# Patient Record
Sex: FEMALE | Race: WHITE | NOT HISPANIC OR LATINO | Employment: UNEMPLOYED | URBAN - METROPOLITAN AREA
[De-identification: names, ages, dates, MRNs, and addresses within clinical notes are randomized per-mention and may not be internally consistent; named-entity substitution may affect disease eponyms.]

---

## 2017-02-08 ENCOUNTER — TELEPHONE (OUTPATIENT)
Dept: FAMILY MEDICINE | Facility: CLINIC | Age: 52
End: 2017-02-08

## 2017-02-15 ENCOUNTER — TELEPHONE (OUTPATIENT)
Dept: FAMILY MEDICINE | Facility: CLINIC | Age: 52
End: 2017-02-15

## 2017-02-15 DIAGNOSIS — E03.9 MYXEDEMA HEART DISEASE: Primary | ICD-10-CM

## 2017-02-15 DIAGNOSIS — I51.9 MYXEDEMA HEART DISEASE: Primary | ICD-10-CM

## 2017-02-15 DIAGNOSIS — E11.9 DIABETES MELLITUS (H): ICD-10-CM

## 2017-02-15 LAB
ALBUMIN SERPL-MCNC: 3.2 G/DL (ref 3.4–5)
ANION GAP SERPL CALCULATED.3IONS-SCNC: 10 MMOL/L (ref 3–14)
BUN SERPL-MCNC: 19 MG/DL (ref 7–30)
CALCIUM SERPL-MCNC: 8.6 MG/DL (ref 8.5–10.1)
CHLORIDE SERPL-SCNC: 110 MMOL/L (ref 94–109)
CHOLEST SERPL-MCNC: 157 MG/DL
CO2 SERPL-SCNC: 23 MMOL/L (ref 20–32)
CREAT SERPL-MCNC: 0.8 MG/DL (ref 0.52–1.04)
GFR SERPL CREATININE-BSD FRML MDRD: 75 ML/MIN/1.7M2
GLUCOSE SERPL-MCNC: 132 MG/DL (ref 70–99)
HBA1C MFR BLD: 6.9 % (ref 4.3–6)
HDLC SERPL-MCNC: 52 MG/DL
LDLC SERPL CALC-MCNC: 81 MG/DL
NONHDLC SERPL-MCNC: 105 MG/DL
PHOSPHATE SERPL-MCNC: 3.2 MG/DL (ref 2.5–4.5)
POTASSIUM SERPL-SCNC: 3.8 MMOL/L (ref 3.4–5.3)
SODIUM SERPL-SCNC: 143 MMOL/L (ref 133–144)
T4 FREE SERPL-MCNC: 1.54 NG/DL (ref 0.76–1.46)
TRIGL SERPL-MCNC: 120 MG/DL
TSH SERPL DL<=0.05 MIU/L-ACNC: 0.08 MU/L (ref 0.4–4)

## 2017-02-15 PROCEDURE — 80061 LIPID PANEL: CPT | Performed by: INTERNAL MEDICINE

## 2017-02-15 PROCEDURE — 83036 HEMOGLOBIN GLYCOSYLATED A1C: CPT | Performed by: INTERNAL MEDICINE

## 2017-02-15 PROCEDURE — 84439 ASSAY OF FREE THYROXINE: CPT | Performed by: INTERNAL MEDICINE

## 2017-02-15 PROCEDURE — 84443 ASSAY THYROID STIM HORMONE: CPT | Performed by: INTERNAL MEDICINE

## 2017-02-15 PROCEDURE — 80069 RENAL FUNCTION PANEL: CPT | Performed by: INTERNAL MEDICINE

## 2017-02-15 PROCEDURE — 36415 COLL VENOUS BLD VENIPUNCTURE: CPT | Performed by: INTERNAL MEDICINE

## 2017-02-22 PROBLEM — E11.65 TYPE 2 DIABETES MELLITUS WITH HYPERGLYCEMIA (H): Status: ACTIVE | Noted: 2017-02-22

## 2017-02-22 NOTE — TELEPHONE ENCOUNTER
As she uses our clinic for acute care, it would be helpful to have copies of her visits from endocrine and her lab work.  Charleen Sterling, CNP

## 2017-02-22 NOTE — TELEPHONE ENCOUNTER
She gets the blood work at our clinic, it appears, so results are in the system.  Charleen Sterling, CNP

## 2017-02-22 NOTE — TELEPHONE ENCOUNTER
"  Panel Management Review      Patient has the following on her problem list:     Diabetes    ASA: Failed    Last A1C  Lab Results   Component Value Date    A1C 6.9 02/15/2017    A1C 13.5 08/09/2016    A1C 13.0 07/29/2016    A1C 6.9 12/28/2015     A1C tested: failed     Last LDL:    Lab Results   Component Value Date    CHOL 157 02/15/2017     Lab Results   Component Value Date    HDL 52 02/15/2017     Lab Results   Component Value Date    LDL 81 02/15/2017     Lab Results   Component Value Date    TRIG 120 02/15/2017     Lab Results   Component Value Date    CHOLHDLRATIO 3.0 05/30/2014     Lab Results   Component Value Date    NHDL 105 02/15/2017       Is the patient on a Statin? NO             Is the patient on Aspirin? NO        Last three blood pressure readings:  BP Readings from Last 3 Encounters:   11/22/16 138/87   12/14/10 142/80   12/01/10 126/70       Date of last diabetes office visit: patient is seeing her Endocrinologist tomorrow 2/23.     Tobacco History:     History   Smoking Status     Former Smoker     Types: Cigarettes     Quit date: 1/1/2005   Smokeless Tobacco     Not on file           Composite cancer screening  Chart review shows that this patient is due/due soon for the following Pap Smear, Mammogram and Colonoscopy  Summary:    Patient is due/failing the following:   COLONOSCOPY, MAMMOGRAM, PAP and STATIN    Action needed:   None     Type of outreach:    Phone, spoke to patient.  She states she is Not taking a statin. She states her \"cholesterol has never been a issue\". She has apt with Endocrinology tomorrow 2/23/17.     Questions for provider review:    None                                                                                                                                    Rodolfo BRANDON CMA       Chart routed to Provider .          "

## 2018-01-24 DIAGNOSIS — E78.2 MIXED HYPERLIPIDEMIA: ICD-10-CM

## 2018-01-24 DIAGNOSIS — E11.9 DIABETES MELLITUS (H): Primary | ICD-10-CM

## 2018-01-24 DIAGNOSIS — E03.9 ACQUIRED HYPOTHYROIDISM: ICD-10-CM

## 2018-01-24 LAB
ALBUMIN SERPL-MCNC: 3.5 G/DL (ref 3.4–5)
ANION GAP SERPL CALCULATED.3IONS-SCNC: 9 MMOL/L (ref 3–14)
BUN SERPL-MCNC: 21 MG/DL (ref 7–30)
CALCIUM SERPL-MCNC: 8.9 MG/DL (ref 8.5–10.1)
CHLORIDE SERPL-SCNC: 109 MMOL/L (ref 94–109)
CHOLEST SERPL-MCNC: 180 MG/DL
CO2 SERPL-SCNC: 23 MMOL/L (ref 20–32)
CREAT SERPL-MCNC: 0.74 MG/DL (ref 0.52–1.04)
CREAT UR-MCNC: 126 MG/DL
GFR SERPL CREATININE-BSD FRML MDRD: 82 ML/MIN/1.7M2
GLUCOSE SERPL-MCNC: 140 MG/DL (ref 70–99)
HBA1C MFR BLD: 7.1 % (ref 4.3–6)
HDLC SERPL-MCNC: 62 MG/DL
LDLC SERPL CALC-MCNC: 94 MG/DL
MICROALBUMIN UR-MCNC: 109 MG/L
MICROALBUMIN/CREAT UR: 86.51 MG/G CR (ref 0–25)
NONHDLC SERPL-MCNC: 118 MG/DL
PHOSPHATE SERPL-MCNC: 3.6 MG/DL (ref 2.5–4.5)
POTASSIUM SERPL-SCNC: 3.8 MMOL/L (ref 3.4–5.3)
SODIUM SERPL-SCNC: 141 MMOL/L (ref 133–144)
TRIGL SERPL-MCNC: 122 MG/DL
TSH SERPL DL<=0.005 MIU/L-ACNC: 1.89 MU/L (ref 0.4–4)

## 2018-01-24 PROCEDURE — 84443 ASSAY THYROID STIM HORMONE: CPT | Performed by: INTERNAL MEDICINE

## 2018-01-24 PROCEDURE — 80069 RENAL FUNCTION PANEL: CPT | Performed by: INTERNAL MEDICINE

## 2018-01-24 PROCEDURE — 80061 LIPID PANEL: CPT | Performed by: INTERNAL MEDICINE

## 2018-01-24 PROCEDURE — 36415 COLL VENOUS BLD VENIPUNCTURE: CPT | Performed by: INTERNAL MEDICINE

## 2018-01-24 PROCEDURE — 83036 HEMOGLOBIN GLYCOSYLATED A1C: CPT | Performed by: INTERNAL MEDICINE

## 2018-01-24 PROCEDURE — 82043 UR ALBUMIN QUANTITATIVE: CPT | Performed by: INTERNAL MEDICINE

## 2019-02-14 NOTE — PROGRESS NOTES
SUBJECTIVE:   Mary Anne Oates is a 53 year old female who presents to clinic today for the following health issues:      Chief Complaint   Patient presents with     Musculoskeletal Problem     left knee pain     Past Medical History:   Diagnosis Date     Diabetes mellitus type 2, controlled, with complications (H)      Hypothyroidism        Past Surgical History:   Procedure Laterality Date     TUBAL LIGATION      procedure failed       Family History   Problem Relation Age of Onset     Hypertension Mother      Diabetes Type 2  Mother      Diabetes Father      Hypertension Father      Cancer Father         lung     Hypertension Maternal Grandmother      Cancer Maternal Grandmother      Hypertension Maternal Grandfather      Cancer Paternal Grandmother      Cerebrovascular Disease No family hx of      C.A.D. No family hx of      Breast Cancer No family hx of      Cancer - colorectal No family hx of      Prostate Cancer No family hx of        Social History     Tobacco Use     Smoking status: Former Smoker     Packs/day: 1.00     Years: 22.00     Pack years: 22.00     Types: Cigarettes     Last attempt to quit: 2005     Years since quittin.4     Smokeless tobacco: Never Used   Substance Use Topics     Alcohol use: No     Comment: rarely 1-2 every year      No Known Allergies    Current Outpatient Medications   Medication     blood glucose monitoring (ACCU-CHEK DEBI PLUS) test strip     blood glucose monitoring (ACCU-CHEK FASTCLIX) lancets     insulin glargine (LANTUS SOLOSTAR) 100 UNIT/ML PEN     insulin lispro (HUMALOG KWIKPEN) 100 UNIT/ML soln     insulin pen needle 30G X 8 MM     LEVOTHYROXINE SODIUM 200 MCG OR TABS     losartan (COZAAR) 25 MG tablet     No current facility-administered medications for this visit.      Reviewed and updated as needed this visit by clinical staff  Tobacco  Allergies  Meds  Med Hx  Surg Hx  Fam Hx  Soc Hx      Reviewed and updated as needed this visit by  Provider  Allergies  Meds  Med Hx  Surg Hx        1. Establish care    2. Knee pain: Left.  Started Thursday.  Bulloch a snapping sensation while walking.  Soreness and tightness.  No pain but felt like a buckling sensation.  Had to hobble.  States of tightness involving back of leg 1.5 weeks ago.  An hour later, hard to bend, pain on the outside knee.  The following day, pain improved and ROM improved.  As of today, no pain.  States of swelling.  Felt like knee cap slid off initially.  History of varicose vein, knee injury 30 years ago from fall, and power lifting.  0/10, full ROM.  Swelling has unchanged.  No bruising.  No knee surgery.  Took aspirin with some relief.     3. Diabetes type 2: Diagnosed 3 years ago.  Currently blood sugars are ranging 170-180s.  Normally 120s.  Currently on lantus and lispro.  Patient is overweight.  Tries to eat healthy.  Does not exercise.  Patient is currently being followed by Dr. Jose Metzger.  Patient has not got her eyes checked.    4. Hypothyroidism: Patient had ablation treatment.  Currently on synthroid 200 mcg daily.  Diagnosed around 2004.    5. Health maintenance: Patient plans to get her mammogram.    ROS:  Review of Systems   Constitutional: Negative for activity change, appetite change, chills, fatigue, fever and unexpected weight change.   HENT: Negative for congestion, ear discharge, ear pain, sore throat, trouble swallowing and voice change.    Eyes: Negative for photophobia, pain, discharge, redness and visual disturbance.   Respiratory: Negative for cough, shortness of breath and wheezing.    Cardiovascular: Negative for chest pain, palpitations and leg swelling.   Gastrointestinal: Negative for abdominal distention, abdominal pain, constipation, diarrhea, nausea and vomiting.   Genitourinary: Negative for dysuria, flank pain, hematuria and urgency.   Musculoskeletal: Negative for back pain, neck pain and neck stiffness.        States of left knee swelling    Skin: Negative for color change, rash and wound.   Neurological: Negative for dizziness, seizures, syncope, weakness, numbness and headaches.   Psychiatric/Behavioral: Negative for agitation, confusion and sleep disturbance. The patient is not nervous/anxious.        OBJECTIVE:     /90 (BP Location: Left arm, Cuff Size: Adult Large)   Pulse 80   Temp 98  F (36.7  C) (Tympanic)   SpO2 97%   There is no height or weight on file to calculate BMI.  Physical Exam   Constitutional: She is oriented to person, place, and time. No distress.   HENT:   Head: Normocephalic and atraumatic.   Right Ear: External ear normal.   Left Ear: External ear normal.   Mouth/Throat: Oropharynx is clear and moist. No oropharyngeal exudate.   Eyes: Conjunctivae and EOM are normal. Pupils are equal, round, and reactive to light. Right eye exhibits no discharge. Left eye exhibits no discharge.   Neck: Normal range of motion. Neck supple. No tracheal deviation present. No thyromegaly present.   Cardiovascular: Normal rate, regular rhythm and normal heart sounds.   No murmur heard.  Pulmonary/Chest: Effort normal and breath sounds normal. No respiratory distress. She has no wheezes. She has no rales.   Abdominal: Soft.   Overweight   Musculoskeletal: Normal range of motion. She exhibits no edema or deformity.   Mild normal, normal gait, able to squat, patella intact, nontender, good ROM in regards to active knee flexion (hamstrings) and extension (quadriceps), positive Pasquale's involving medial meniscus, negative Lachman's     Lymphadenopathy:     She has no cervical adenopathy.   Neurological: She is alert and oriented to person, place, and time. No cranial nerve deficit. Coordination normal.   Skin: Skin is warm. No rash noted. She is not diaphoretic. No erythema.   Psychiatric: Judgment normal.       Component      Latest Ref Rng & Units 8/9/2016 2/15/2017 1/24/2018   Cholesterol      <200 mg/dL  157 180   Triglycerides      <150  mg/dL  120 122   HDL Cholesterol      >49 mg/dL  52 62   LDL Cholesterol Calculated      <100 mg/dL  81 94   VLDL-Cholesterol      0 - 30 mg/dL      Cholesterol/HDL Ratio      0.0 - 5.0      Non HDL Cholesterol      <130 mg/dL  105 118   Hemoglobin A1C      4.3 - 6.0 % 13.5 (H) 6.9 (H) 7.1 (H)       ASSESSMENT/PLAN:     1. Acute pain of left knee  Concerning for medial meniscal strain.  Stressed the importance of weight loss.   - XR Knee Standing Left 2 Views; Future  - [facility] PT, HAND, AND CHIROPRACTIC REFERRAL; Future    2. Type 2 diabetes mellitus with hyperglycemia, with long-term current use of insulin (H)  Advised to keep appointment with endocrinology.  Schedule eye appointment.  Consider adding ACE-inhibitor if blood pressure is not well controlled.   - Hemoglobin A1c  - Basic metabolic panel  (Ca, Cl, CO2, Creat, Gluc, K, Na, BUN)  - Albumin Random Urine Quantitative with Creat Ratio    3. Special screening for malignant neoplasms, colon  - Fecal colorectal cancer screen (FIT); Future    4. Hypothyroidism, unspecified type  Continue with synthroid.   - TSH with free T4 reflex    See Patient Instructions    Anthony Richard, [facility]

## 2019-02-15 ENCOUNTER — ANCILLARY PROCEDURE (OUTPATIENT)
Dept: GENERAL RADIOLOGY | Facility: CLINIC | Age: 54
End: 2019-02-15
Attending: FAMILY MEDICINE
Payer: COMMERCIAL

## 2019-02-15 ENCOUNTER — OFFICE VISIT (OUTPATIENT)
Dept: FAMILY MEDICINE | Facility: CLINIC | Age: 54
End: 2019-02-15
Payer: COMMERCIAL

## 2019-02-15 VITALS
DIASTOLIC BLOOD PRESSURE: 90 MMHG | HEART RATE: 80 BPM | SYSTOLIC BLOOD PRESSURE: 168 MMHG | TEMPERATURE: 98 F | OXYGEN SATURATION: 97 %

## 2019-02-15 DIAGNOSIS — E11.65 TYPE 2 DIABETES MELLITUS WITH HYPERGLYCEMIA, WITH LONG-TERM CURRENT USE OF INSULIN (H): ICD-10-CM

## 2019-02-15 DIAGNOSIS — E03.9 HYPOTHYROIDISM, UNSPECIFIED TYPE: ICD-10-CM

## 2019-02-15 DIAGNOSIS — Z12.11 SPECIAL SCREENING FOR MALIGNANT NEOPLASMS, COLON: ICD-10-CM

## 2019-02-15 DIAGNOSIS — Z79.4 TYPE 2 DIABETES MELLITUS WITH HYPERGLYCEMIA, WITH LONG-TERM CURRENT USE OF INSULIN (H): ICD-10-CM

## 2019-02-15 DIAGNOSIS — M25.562 ACUTE PAIN OF LEFT KNEE: ICD-10-CM

## 2019-02-15 DIAGNOSIS — M25.562 ACUTE PAIN OF LEFT KNEE: Primary | ICD-10-CM

## 2019-02-15 LAB
ANION GAP SERPL CALCULATED.3IONS-SCNC: 6 MMOL/L (ref 3–14)
BUN SERPL-MCNC: 18 MG/DL (ref 7–30)
CALCIUM SERPL-MCNC: 8.9 MG/DL (ref 8.5–10.1)
CHLORIDE SERPL-SCNC: 105 MMOL/L (ref 94–109)
CO2 SERPL-SCNC: 25 MMOL/L (ref 20–32)
CREAT SERPL-MCNC: 0.66 MG/DL (ref 0.52–1.04)
CREAT UR-MCNC: 225 MG/DL
GFR SERPL CREATININE-BSD FRML MDRD: >90 ML/MIN/{1.73_M2}
GLUCOSE SERPL-MCNC: 178 MG/DL (ref 70–99)
HBA1C MFR BLD: 8.8 % (ref 0–5.6)
MICROALBUMIN UR-MCNC: 187 MG/L
MICROALBUMIN/CREAT UR: 83.11 MG/G CR (ref 0–25)
POTASSIUM SERPL-SCNC: 3.8 MMOL/L (ref 3.4–5.3)
SODIUM SERPL-SCNC: 136 MMOL/L (ref 133–144)
T4 FREE SERPL-MCNC: 1.39 NG/DL (ref 0.76–1.46)
TSH SERPL DL<=0.005 MIU/L-ACNC: 0.05 MU/L (ref 0.4–4)

## 2019-02-15 PROCEDURE — 84439 ASSAY OF FREE THYROXINE: CPT | Performed by: FAMILY MEDICINE

## 2019-02-15 PROCEDURE — 73560 X-RAY EXAM OF KNEE 1 OR 2: CPT | Mod: LT

## 2019-02-15 PROCEDURE — 83036 HEMOGLOBIN GLYCOSYLATED A1C: CPT | Performed by: FAMILY MEDICINE

## 2019-02-15 PROCEDURE — 84443 ASSAY THYROID STIM HORMONE: CPT | Performed by: FAMILY MEDICINE

## 2019-02-15 PROCEDURE — 80048 BASIC METABOLIC PNL TOTAL CA: CPT | Performed by: FAMILY MEDICINE

## 2019-02-15 PROCEDURE — 99214 OFFICE O/P EST MOD 30 MIN: CPT | Performed by: FAMILY MEDICINE

## 2019-02-15 PROCEDURE — 82043 UR ALBUMIN QUANTITATIVE: CPT | Performed by: FAMILY MEDICINE

## 2019-02-15 PROCEDURE — 36415 COLL VENOUS BLD VENIPUNCTURE: CPT | Performed by: FAMILY MEDICINE

## 2019-02-15 RX ORDER — LOSARTAN POTASSIUM 25 MG/1
TABLET ORAL
Refills: 3 | COMMUNITY
Start: 2019-01-31 | End: 2022-12-08

## 2019-02-15 ASSESSMENT — ENCOUNTER SYMPTOMS
NECK STIFFNESS: 0
ACTIVITY CHANGE: 0
FLANK PAIN: 0
CONFUSION: 0
EYE REDNESS: 0
FATIGUE: 0
AGITATION: 0
SEIZURES: 0
HEMATURIA: 0
VOMITING: 0
APPETITE CHANGE: 0
WOUND: 0
BACK PAIN: 0
COUGH: 0
TROUBLE SWALLOWING: 0
UNEXPECTED WEIGHT CHANGE: 0
PALPITATIONS: 0
WHEEZING: 0
EYE DISCHARGE: 0
ABDOMINAL PAIN: 0
SLEEP DISTURBANCE: 0
EYE PAIN: 0
NECK PAIN: 0
DYSURIA: 0
SHORTNESS OF BREATH: 0
HEADACHES: 0
CONSTIPATION: 0
NUMBNESS: 0
CHILLS: 0
VOICE CHANGE: 0
FEVER: 0
DIARRHEA: 0
SORE THROAT: 0
NERVOUS/ANXIOUS: 0
NAUSEA: 0
DIZZINESS: 0
PHOTOPHOBIA: 0
COLOR CHANGE: 0
ABDOMINAL DISTENTION: 0
WEAKNESS: 0

## 2019-02-15 NOTE — LETTER
February 18, 2019      Mary Anne Oates  [address] [address]        Dear ,    We are writing to inform you of your test results.    Your kidney function is normal.  Your blood sugar is elevated which is most likely due to your poorly controlled diabetes.   Your thyroid levels are stable.     Resulted Orders   Hemoglobin A1c   Result Value Ref Range    Hemoglobin A1C 8.8 (H) 0 - 5.6 %      Comment:      Normal <5.7% Prediabetes 5.7-6.4%  Diabetes 6.5% or higher - adopted from ADA   consensus guidelines.     Basic metabolic panel  (Ca, Cl, CO2, Creat, Gluc, K, Na, BUN)   Result Value Ref Range    Sodium 136 133 - 144 mmol/L    Potassium 3.8 3.4 - 5.3 mmol/L    Chloride 105 94 - 109 mmol/L    Carbon Dioxide 25 20 - 32 mmol/L    Anion Gap 6 3 - 14 mmol/L    Glucose 178 (H) 70 - 99 mg/dL      Comment:      Non Fasting    Urea Nitrogen 18 7 - 30 mg/dL    Creatinine 0.66 0.52 - 1.04 mg/dL    GFR Estimate >90 >60 mL/min/[1.73_m2]      Comment:      Non  GFR Calc  Starting 12/18/2018, serum creatinine based estimated GFR (eGFR) will be   calculated using the Chronic Kidney Disease Epidemiology Collaboration   (CKD-EPI) equation.      GFR Estimate If Black >90 >60 mL/min/[1.73_m2]      Comment:       GFR Calc  Starting 12/18/2018, serum creatinine based estimated GFR (eGFR) will be   calculated using the Chronic Kidney Disease Epidemiology Collaboration   (CKD-EPI) equation.      Calcium 8.9 8.5 - 10.1 mg/dL   Albumin Random Urine Quantitative with Creat Ratio   Result Value Ref Range    Creatinine Urine 225 mg/dL    Albumin Urine mg/L 187 mg/L    Albumin Urine mg/g Cr 83.11 (H) 0 - 25 mg/g Cr   TSH with free T4 reflex   Result Value Ref Range    TSH 0.05 (L) 0.40 - 4.00 mU/L   T4 free   Result Value Ref Range    T4 Free 1.39 0.76 - 1.46 ng/dL       If you have any questions or concerns, please call the clinic at the number listed above.        Sincerely,        Dr. Anthony Richard

## 2019-02-15 NOTE — LETTER
February 15, 2019      Mary Anne Oates  [address] [address]        Dear ,    We are writing to inform you of your test results.    Your diabetes control is poor and worse compared to your previous results.  Per our discussion, please schedule your endocrinology appointment.  Please decrease carbohydrate intake (bread, potato, pasta, and sweets).  Encourage 2 hours per week of cardiovascular activities (cycling, swimming, jogging, elliptical, or treadmill).  Lets shoot for a goal of 1-2 lbs weight loss per week.     Resulted Orders   Hemoglobin A1c   Result Value Ref Range    Hemoglobin A1C 8.8 (H) 0 - 5.6 %      Comment:      Normal <5.7% Prediabetes 5.7-6.4%  Diabetes 6.5% or higher - adopted from ADA   consensus guidelines.       Component      Latest Ref Rng & Units 2/15/2017 1/24/2018 2/15/2019   Hemoglobin A1C      0 - 5.6 % 6.9 (H) 7.1 (H) 8.8 (H)       If you have any questions or concerns, please call the clinic at the number listed above.       Sincerely,        Dr. Anthony Richard

## 2019-03-18 DIAGNOSIS — E03.9 HYPOTHYROIDISM, ADULT: ICD-10-CM

## 2019-03-18 DIAGNOSIS — E11.9 DIABETES MELLITUS (H): Primary | ICD-10-CM

## 2019-03-18 DIAGNOSIS — E78.2 MIXED HYPERLIPIDEMIA: ICD-10-CM

## 2019-03-18 LAB
ALBUMIN SERPL-MCNC: 3.5 G/DL (ref 3.4–5)
ANION GAP SERPL CALCULATED.3IONS-SCNC: 7 MMOL/L (ref 3–14)
BUN SERPL-MCNC: 22 MG/DL (ref 7–30)
CALCIUM SERPL-MCNC: 8.8 MG/DL (ref 8.5–10.1)
CHLORIDE SERPL-SCNC: 107 MMOL/L (ref 94–109)
CHOLEST SERPL-MCNC: 184 MG/DL
CO2 SERPL-SCNC: 27 MMOL/L (ref 20–32)
CREAT SERPL-MCNC: 0.74 MG/DL (ref 0.52–1.04)
CREAT UR-MCNC: 272 MG/DL
GFR SERPL CREATININE-BSD FRML MDRD: >90 ML/MIN/{1.73_M2}
GLUCOSE SERPL-MCNC: 208 MG/DL (ref 70–99)
HBA1C MFR BLD: 8.8 % (ref 0–5.6)
HDLC SERPL-MCNC: 58 MG/DL
LDLC SERPL CALC-MCNC: 95 MG/DL
MICROALBUMIN UR-MCNC: 240 MG/L
MICROALBUMIN/CREAT UR: 88.24 MG/G CR (ref 0–25)
NONHDLC SERPL-MCNC: 126 MG/DL
PHOSPHATE SERPL-MCNC: 3.3 MG/DL (ref 2.5–4.5)
POTASSIUM SERPL-SCNC: 3.9 MMOL/L (ref 3.4–5.3)
SODIUM SERPL-SCNC: 141 MMOL/L (ref 133–144)
T4 FREE SERPL-MCNC: 1.32 NG/DL (ref 0.76–1.46)
TRIGL SERPL-MCNC: 156 MG/DL
TSH SERPL DL<=0.005 MIU/L-ACNC: 0.09 MU/L (ref 0.4–4)

## 2019-03-18 PROCEDURE — 84439 ASSAY OF FREE THYROXINE: CPT | Performed by: INTERNAL MEDICINE

## 2019-03-18 PROCEDURE — 80069 RENAL FUNCTION PANEL: CPT | Performed by: INTERNAL MEDICINE

## 2019-03-18 PROCEDURE — 36415 COLL VENOUS BLD VENIPUNCTURE: CPT | Performed by: INTERNAL MEDICINE

## 2019-03-18 PROCEDURE — 84443 ASSAY THYROID STIM HORMONE: CPT | Performed by: INTERNAL MEDICINE

## 2019-03-18 PROCEDURE — 82043 UR ALBUMIN QUANTITATIVE: CPT | Performed by: INTERNAL MEDICINE

## 2019-03-18 PROCEDURE — 83036 HEMOGLOBIN GLYCOSYLATED A1C: CPT | Performed by: INTERNAL MEDICINE

## 2019-03-18 PROCEDURE — 80061 LIPID PANEL: CPT | Performed by: INTERNAL MEDICINE

## 2019-03-26 ENCOUNTER — TELEPHONE (OUTPATIENT)
Dept: FAMILY MEDICINE | Facility: CLINIC | Age: 54
End: 2019-03-26

## 2019-03-27 NOTE — TELEPHONE ENCOUNTER
I have faxed the form to medical records for them to mail the medical records too the social security Admin.    Inocencia Dinh, [place]

## 2019-04-30 ENCOUNTER — TELEPHONE (OUTPATIENT)
Dept: FAMILY MEDICINE | Facility: CLINIC | Age: 54
End: 2019-04-30

## 2019-04-30 NOTE — TELEPHONE ENCOUNTER
Panel Management Review      Patient has the following on her problem list:     Diabetes    ASA: Failed    Last A1C  Lab Results   Component Value Date    A1C 8.8 03/18/2019    A1C 8.8 02/15/2019    A1C 7.1 01/24/2018    A1C 6.9 02/15/2017    A1C 13.5 08/09/2016     A1C tested: FAILED    Last LDL:    Lab Results   Component Value Date    CHOL 184 03/18/2019     Lab Results   Component Value Date    HDL 58 03/18/2019     Lab Results   Component Value Date    LDL 95 03/18/2019     Lab Results   Component Value Date    TRIG 156 03/18/2019     Lab Results   Component Value Date    CHOLHDLRATIO 3.0 05/30/2014     Lab Results   Component Value Date    NHDL 126 03/18/2019       Is the patient on a Statin? NO             Is the patient on Aspirin? NO        Last three blood pressure readings:  BP Readings from Last 3 Encounters:   02/15/19 168/90   11/22/16 138/87   12/14/10 142/80       Date of last diabetes office visit: 2/15/19     Tobacco History:     History   Smoking Status     Former Smoker     Packs/day: 1.00     Years: 22.00     Types: Cigarettes     Quit date: 9/18/2005   Smokeless Tobacco     Never Used           Composite cancer screening  Chart review shows that this patient is due/due soon for the following Pap Smear, Mammogram and Colonoscopy  Summary:    Patient is due/failing the following:   COLONOSCOPY, MAMMOGRAM, PAP and PHYSICAL    Action needed:   Patient needs office visit for physical, diabetes.    Type of outreach:    Sent letter.    Questions for provider review:    None                                                                                                                                    Kathy Mcwilliams CMA on 4/30/2019 at 2:42 PM       Chart routed to none .

## 2019-04-30 NOTE — LETTER
April 30, 2019      Mary Anne Oates  [address] [address]        Dear Mary Anne,     As part of [place]'s commitment to health and wellness we have reviewed your chart and it indicates that you are due for one or more of the following:    -- Pap smear.  These are recommended every 3 years. Please call our clinic to schedule your pap smear / physical appointment. Please plan to be fasting for this appointment (nothing to eat or drink except water and medications).   -- Mammogram.   Please call one of the following numbers to schedule:  ** [facility] [phone] (at [facility] once a month)  [facility] [phone]  [facility] [phone]  [facility] [phone]  [facility] [phone]  [facility] [phone]  -- Colon screen. Colonoscopy or FIT test (take home test). One of these tests is recommended at age 50 to screen for colon cancer. Please call one of the following numbers to schedule a colonoscopy:  [facility] [phone]  [facility] [phone]  [facility] [phone]  [facility] [phone] (multiple sites, call for locations)  OR....  If you prefer to do a screening that is LESS INVASIVE AND LESS EXPENSIVE there is an test for you! It is called the FIT test. It is a screening test that is done yearly and can be DONE AT HOME! Do the test at home and mail it in (you don't even have to pay for postage). If you are willing to do this test, we can order the kit for you to  at our clinic. Please call us at [phone] if you need an order for a colonoscopy or FIT testing.      Please try to schedule and/or complete the tests above within the next 2-4 weeks.   The number to call to schedule an appointment at [facility] is [phone].    While we work hard to maintain accurate records, it is always possible that this notice does not accurately reflect tests that you may have had. To  ensure that we do not send you unnecessary notices please verify that we have accurate dates of your tests (even if these were done many years ago) or if you are seeking care at another clinic.      Sincerely,     Dr. Richard/NL

## 2019-06-20 DIAGNOSIS — E11.9 DIABETES MELLITUS (H): Primary | ICD-10-CM

## 2019-06-20 DIAGNOSIS — E03.9 PRIMARY HYPOTHYROIDISM: ICD-10-CM

## 2019-06-20 DIAGNOSIS — E78.2 MIXED HYPERLIPIDEMIA: ICD-10-CM

## 2019-06-20 LAB
ALBUMIN SERPL-MCNC: 3.5 G/DL (ref 3.4–5)
ANION GAP SERPL CALCULATED.3IONS-SCNC: 7 MMOL/L (ref 3–14)
BUN SERPL-MCNC: 20 MG/DL (ref 7–30)
CALCIUM SERPL-MCNC: 8.9 MG/DL (ref 8.5–10.1)
CHLORIDE SERPL-SCNC: 111 MMOL/L (ref 94–109)
CHOLEST SERPL-MCNC: 152 MG/DL
CO2 SERPL-SCNC: 25 MMOL/L (ref 20–32)
CREAT SERPL-MCNC: 0.71 MG/DL (ref 0.52–1.04)
GFR SERPL CREATININE-BSD FRML MDRD: >90 ML/MIN/{1.73_M2}
GLUCOSE SERPL-MCNC: 119 MG/DL (ref 70–99)
HBA1C MFR BLD: 8.2 % (ref 0–5.6)
HDLC SERPL-MCNC: 59 MG/DL
LDLC SERPL CALC-MCNC: 69 MG/DL
NONHDLC SERPL-MCNC: 93 MG/DL
PHOSPHATE SERPL-MCNC: 3 MG/DL (ref 2.5–4.5)
POTASSIUM SERPL-SCNC: 3.6 MMOL/L (ref 3.4–5.3)
SODIUM SERPL-SCNC: 143 MMOL/L (ref 133–144)
T4 FREE SERPL-MCNC: 1.31 NG/DL (ref 0.76–1.46)
TRIGL SERPL-MCNC: 118 MG/DL
TSH SERPL DL<=0.005 MIU/L-ACNC: 0.36 MU/L (ref 0.4–4)

## 2019-06-20 PROCEDURE — 36415 COLL VENOUS BLD VENIPUNCTURE: CPT | Performed by: INTERNAL MEDICINE

## 2019-06-20 PROCEDURE — 80069 RENAL FUNCTION PANEL: CPT | Performed by: INTERNAL MEDICINE

## 2019-06-20 PROCEDURE — 84443 ASSAY THYROID STIM HORMONE: CPT | Performed by: INTERNAL MEDICINE

## 2019-06-20 PROCEDURE — 80061 LIPID PANEL: CPT | Performed by: INTERNAL MEDICINE

## 2019-06-20 PROCEDURE — 84439 ASSAY OF FREE THYROXINE: CPT | Performed by: INTERNAL MEDICINE

## 2019-06-20 PROCEDURE — 83036 HEMOGLOBIN GLYCOSYLATED A1C: CPT | Performed by: INTERNAL MEDICINE

## 2019-07-02 ENCOUNTER — TELEPHONE (OUTPATIENT)
Dept: FAMILY MEDICINE | Facility: CLINIC | Age: 54
End: 2019-07-02

## 2019-07-02 NOTE — TELEPHONE ENCOUNTER
Panel Management Review      Patient has the following on her problem list:     Diabetes    ASA: Not Required     Last A1C  Lab Results   Component Value Date    A1C 8.2 06/20/2019    A1C 8.8 03/18/2019    A1C 8.8 02/15/2019    A1C 7.1 01/24/2018    A1C 6.9 02/15/2017     A1C tested: FAILED    Last LDL:    Lab Results   Component Value Date    CHOL 152 06/20/2019     Lab Results   Component Value Date    HDL 59 06/20/2019     Lab Results   Component Value Date    LDL 69 06/20/2019     Lab Results   Component Value Date    TRIG 118 06/20/2019     Lab Results   Component Value Date    CHOLHDLRATIO 3.0 05/30/2014     Lab Results   Component Value Date    NHDL 93 06/20/2019       Is the patient on a Statin? NO             Is the patient on Aspirin? NO        Last three blood pressure readings:  BP Readings from Last 3 Encounters:   02/15/19 168/90   11/22/16 138/87   12/14/10 142/80       Date of last diabetes office visit: 2/15/19     Tobacco History:     History   Smoking Status     Former Smoker     Packs/day: 1.00     Years: 22.00     Types: Cigarettes     Quit date: 9/18/2005   Smokeless Tobacco     Never Used           Composite cancer screening  Chart review shows that this patient is due/due soon for the following Pap Smear, Mammogram and Colonoscopy  Summary:    Patient is due/failing the following:   A1C, mammogram, colonoscopy/fit, physical, pap, diabetic foot exam     Action needed:   Patient sees obgyn for physical/pap/mammogram. Sees endocrinology for diabetes management.     Type of outreach:    none    Questions for provider review:    None                                                                                                                                    Kathy Mcwilliams CMA on 7/2/2019 at 10:25 AM     Chart routed to none .

## 2019-09-12 ENCOUNTER — TELEPHONE (OUTPATIENT)
Dept: FAMILY MEDICINE | Facility: CLINIC | Age: 54
End: 2019-09-12

## 2019-09-12 NOTE — LETTER
September 12, 2019      Mary Anne Oates  [address] [address]        Dear Mary Anne,     As part of [place]'s commitment to health and wellness we have reviewed your chart and it indicates that you are due for one or more of the following:    -physical exam    -- Mammogram.   Please call one of the following numbers to schedule:  ** [facility] [phone] (at [facility] once a month)  [facility] [phone]  [facility] [phone]  [facility] [phone]  [facility] [phone]  [employer] [facility] [phone]    -- Colon screen. Colonoscopy or FIT test (take home test). One of these tests is recommended at age 50 to screen for colon cancer.Please call one of the following numbers to schedule a colonoscopy:  [facility] [phone]  [facility] [phone]  [facility] [phone]  [facility] [phone] (multiple sites, call for locations)  OR....  If you prefer to do a screening that is LESS INVASIVE AND LESS EXPENSIVE there is an test for you! It is called the FIT test. It is a screening test that is done yearly and can be DONE AT HOME! Do the test at home and mail it in (you don't even have to pay for postage). If you are willing to do this test, we can order the kit for you to  at our clinic. Please call us at [phone] if you need an order for a colonoscopy or FIT testing.    -Diabetes visit    Please try to schedule and/or complete the tests above within the next 2-4 weeks.   The number to call to schedule an appointment at [facility] is [phone].    While we work hard to maintain accurate records, it is always possible that this notice does not accurately reflect tests that you may have had. To ensure that we do not send you unnecessary notices please verify that we have accurate dates of your tests (even if these were done many years ago) or if you are seeking care at another  clinic.      Sincerely,     Dr. Richard/nl

## 2019-09-12 NOTE — TELEPHONE ENCOUNTER
Panel Management Review      Patient has the following on her problem list:     Diabetes    ASA:     Last A1C  Lab Results   Component Value Date    A1C 8.2 06/20/2019    A1C 8.8 03/18/2019    A1C 8.8 02/15/2019    A1C 7.1 01/24/2018    A1C 6.9 02/15/2017     A1C tested: FAILED    Last LDL:    Lab Results   Component Value Date    CHOL 152 06/20/2019     Lab Results   Component Value Date    HDL 59 06/20/2019     Lab Results   Component Value Date    LDL 69 06/20/2019     Lab Results   Component Value Date    TRIG 118 06/20/2019     Lab Results   Component Value Date    CHOLHDLRATIO 3.0 05/30/2014     Lab Results   Component Value Date    NHDL 93 06/20/2019       Is the patient on a Statin? NO             Is the patient on Aspirin? NO        Last three blood pressure readings:  BP Readings from Last 3 Encounters:   02/15/19 168/90   11/22/16 138/87   12/14/10 142/80       Date of last diabetes office visit:      Tobacco History:     History   Smoking Status     Former Smoker     Packs/day: 1.00     Years: 22.00     Types: Cigarettes     Quit date: 9/18/2005   Smokeless Tobacco     Never Used           IVD   ASA: FAILED    Last LDL:    Lab Results   Component Value Date    CHOL 152 06/20/2019     Lab Results   Component Value Date    HDL 59 06/20/2019     Lab Results   Component Value Date    LDL 69 06/20/2019     Lab Results   Component Value Date    TRIG 118 06/20/2019        Lab Results   Component Value Date    CHOLHDLRATIO 3.0 05/30/2014        Is the patient on a Statin? NO   Is the patient on Aspirin? NO                    Last three blood pressure readings:  BP Readings from Last 3 Encounters:   02/15/19 168/90   11/22/16 138/87   12/14/10 142/80        Tobacco History:     History   Smoking Status     Former Smoker     Packs/day: 1.00     Years: 22.00     Types: Cigarettes     Quit date: 9/18/2005   Smokeless Tobacco     Never Used         Composite cancer screening  Chart review shows that this patient is  due/due soon for the following Pap Smear, Mammogram and Colonoscopy  Summary:    Patient is due/failing the following:   COLONOSCOPY, MAMMOGRAM, PAP and PHYSICAL    Action needed:   Patient needs office visit for diabetes visit, physical.    Type of outreach:    Sent letter.    Questions for provider review:    None                                                                                                                                    Kathy Mcwilliams CMA on 9/12/2019 at 10:52 AM       Chart routed to none .

## 2020-01-09 ENCOUNTER — TELEPHONE (OUTPATIENT)
Dept: FAMILY MEDICINE | Facility: CLINIC | Age: 55
End: 2020-01-09

## 2020-01-09 NOTE — TELEPHONE ENCOUNTER
Panel Management Review      Patient has the following on her problem list:     Diabetes    ASA: Failed    Last A1C  Lab Results   Component Value Date    A1C 8.2 06/20/2019    A1C 8.8 03/18/2019    A1C 8.8 02/15/2019    A1C 7.1 01/24/2018    A1C 6.9 02/15/2017     A1C tested: FAILED    Last LDL:    Lab Results   Component Value Date    CHOL 152 06/20/2019     Lab Results   Component Value Date    HDL 59 06/20/2019     Lab Results   Component Value Date    LDL 69 06/20/2019     Lab Results   Component Value Date    TRIG 118 06/20/2019     Lab Results   Component Value Date    CHOLHDLRATIO 3.0 05/30/2014     Lab Results   Component Value Date    NHDL 93 06/20/2019       Is the patient on a Statin? NO             Is the patient on Aspirin? NO        Last three blood pressure readings:  BP Readings from Last 3 Encounters:   02/15/19 168/90   11/22/16 138/87   12/14/10 142/80       Date of last diabetes office visit: 2/15/2019     Tobacco History:     History   Smoking Status     Former Smoker     Packs/day: 1.00     Years: 22.00     Types: Cigarettes     Quit date: 9/18/2005   Smokeless Tobacco     Never Used           Composite cancer screening  Chart review shows that this patient is due/due soon for the following Pap Smear, Mammogram and Colonoscopy  Summary:    Patient is due/failing the following:   PHYSICAL, MAMMO, COLONOSCOPY, DIABETES VISIT, LABS    Action needed:   Patient needs office visit for physical, diabetes.    Type of outreach:    Sent letter.    Questions for provider review:    None                                                                                                                                    Kathy Mcwilliams CMA on 1/9/2020 at 2:22 PM       Chart routed to none .

## 2020-01-09 NOTE — LETTER
January 9, 2020      Mary Anne MATTHEW Иван  [address] [facility] [address]        Dear Mary Anne,     As part of [place]'s commitment to health and wellness we have reviewed your chart and it indicates that you are due for one or more of the following:    -Annual physical  -diabetes visit     Please try to schedule and/or complete the tests above within the next 2-4 weeks.   The number to call to schedule an appointment at [facility] is [phone].    While we work hard to maintain accurate records, it is always possible that this notice does not accurately reflect tests that you may have had. To ensure that we do not send you unnecessary notices please verify that we have accurate dates of your tests (even if these were done many years ago) or if you are seeking care at another clinic.      Sincerely,     [facility]

## 2020-09-14 DIAGNOSIS — E11.9 DIABETES MELLITUS (H): Primary | ICD-10-CM

## 2020-09-14 DIAGNOSIS — E78.2 MIXED HYPERLIPIDEMIA: ICD-10-CM

## 2020-09-14 DIAGNOSIS — E89.0 POSTSURGICAL HYPOTHYROIDISM: ICD-10-CM

## 2020-09-14 LAB
ALBUMIN SERPL-MCNC: 3.2 G/DL (ref 3.4–5)
ANION GAP SERPL CALCULATED.3IONS-SCNC: 6 MMOL/L (ref 3–14)
BUN SERPL-MCNC: 19 MG/DL (ref 7–30)
CALCIUM SERPL-MCNC: 8.8 MG/DL (ref 8.5–10.1)
CHLORIDE SERPL-SCNC: 111 MMOL/L (ref 94–109)
CHOLEST SERPL-MCNC: 154 MG/DL
CO2 SERPL-SCNC: 25 MMOL/L (ref 20–32)
CREAT SERPL-MCNC: 0.6 MG/DL (ref 0.52–1.04)
CREAT UR-MCNC: 119 MG/DL
GFR SERPL CREATININE-BSD FRML MDRD: >90 ML/MIN/{1.73_M2}
GLUCOSE SERPL-MCNC: 156 MG/DL (ref 70–99)
HBA1C MFR BLD: 7.7 % (ref 0–5.6)
HDLC SERPL-MCNC: 57 MG/DL
LDLC SERPL CALC-MCNC: 75 MG/DL
MICROALBUMIN UR-MCNC: 111 MG/L
MICROALBUMIN/CREAT UR: 93.28 MG/G CR (ref 0–25)
NONHDLC SERPL-MCNC: 97 MG/DL
PHOSPHATE SERPL-MCNC: 3 MG/DL (ref 2.5–4.5)
POTASSIUM SERPL-SCNC: 3.8 MMOL/L (ref 3.4–5.3)
SODIUM SERPL-SCNC: 142 MMOL/L (ref 133–144)
T4 FREE SERPL-MCNC: 1.32 NG/DL (ref 0.76–1.46)
TRIGL SERPL-MCNC: 108 MG/DL
TSH SERPL DL<=0.005 MIU/L-ACNC: 0.14 MU/L (ref 0.4–4)

## 2020-09-14 PROCEDURE — 84439 ASSAY OF FREE THYROXINE: CPT | Performed by: INTERNAL MEDICINE

## 2020-09-14 PROCEDURE — 83036 HEMOGLOBIN GLYCOSYLATED A1C: CPT | Performed by: INTERNAL MEDICINE

## 2020-09-14 PROCEDURE — 80061 LIPID PANEL: CPT | Performed by: INTERNAL MEDICINE

## 2020-09-14 PROCEDURE — 82043 UR ALBUMIN QUANTITATIVE: CPT | Performed by: INTERNAL MEDICINE

## 2020-09-14 PROCEDURE — 36415 COLL VENOUS BLD VENIPUNCTURE: CPT | Performed by: INTERNAL MEDICINE

## 2020-09-14 PROCEDURE — 80069 RENAL FUNCTION PANEL: CPT | Performed by: INTERNAL MEDICINE

## 2020-09-14 PROCEDURE — 84443 ASSAY THYROID STIM HORMONE: CPT | Performed by: INTERNAL MEDICINE

## 2021-05-25 ENCOUNTER — RECORDS - HEALTHEAST (OUTPATIENT)
Dept: ADMINISTRATIVE | Facility: CLINIC | Age: 56
End: 2021-05-25

## 2021-05-29 ENCOUNTER — RECORDS - HEALTHEAST (OUTPATIENT)
Dept: ADMINISTRATIVE | Facility: CLINIC | Age: 56
End: 2021-05-29

## 2021-11-15 ENCOUNTER — DOCUMENTATION ONLY (OUTPATIENT)
Dept: LAB | Facility: CLINIC | Age: 56
End: 2021-11-15
Payer: COMMERCIAL

## 2021-11-15 NOTE — PROGRESS NOTES
Mary Anne Oates has an upcoming lab appointment:    Future Appointments   Date Time Provider Department Center   11/16/2021  9:30 AM [facility]     Patient is scheduled for the following lab(s): fasting labs    There is no order available. Please review and place either future orders or HMPO (Review of Health Maintenance Protocol Orders), as appropriate.    Health Maintenance Due   Topic     ANNUAL REVIEW OF HM ORDERS      HIV SCREENING      HEPATITIS C SCREENING      A1C      BMP      LIPID      MICROALBUMIN      Marj Lugo

## 2022-01-07 ENCOUNTER — LAB (OUTPATIENT)
Dept: LAB | Facility: CLINIC | Age: 57
End: 2022-01-07
Payer: COMMERCIAL

## 2022-01-07 DIAGNOSIS — E11.9 DIABETES MELLITUS (H): Primary | ICD-10-CM

## 2022-01-07 DIAGNOSIS — I51.9 MYXEDEMA HEART DISEASE: ICD-10-CM

## 2022-01-07 DIAGNOSIS — E78.2 MIXED HYPERLIPIDEMIA: ICD-10-CM

## 2022-01-07 DIAGNOSIS — E03.9 MYXEDEMA HEART DISEASE: ICD-10-CM

## 2022-01-07 LAB
ALBUMIN SERPL-MCNC: 3.1 G/DL (ref 3.4–5)
ALP SERPL-CCNC: 77 U/L (ref 40–150)
ALT SERPL W P-5'-P-CCNC: 29 U/L (ref 0–50)
ANION GAP SERPL CALCULATED.3IONS-SCNC: 3 MMOL/L (ref 3–14)
AST SERPL W P-5'-P-CCNC: 10 U/L (ref 0–45)
BILIRUB DIRECT SERPL-MCNC: <0.1 MG/DL (ref 0–0.2)
BILIRUB SERPL-MCNC: 0.3 MG/DL (ref 0.2–1.3)
BUN SERPL-MCNC: 25 MG/DL (ref 7–30)
CALCIUM SERPL-MCNC: 9.1 MG/DL (ref 8.5–10.1)
CHLORIDE BLD-SCNC: 110 MMOL/L (ref 94–109)
CHOLEST SERPL-MCNC: 172 MG/DL
CO2 SERPL-SCNC: 27 MMOL/L (ref 20–32)
CREAT SERPL-MCNC: 0.88 MG/DL (ref 0.52–1.04)
CREAT UR-MCNC: 176 MG/DL
FASTING STATUS PATIENT QL REPORTED: NORMAL
GFR SERPL CREATININE-BSD FRML MDRD: 77 ML/MIN/1.73M2
GLUCOSE BLD-MCNC: 132 MG/DL (ref 70–99)
HBA1C MFR BLD: 7.1 % (ref 0–5.6)
HDLC SERPL-MCNC: 55 MG/DL
LDLC SERPL CALC-MCNC: 95 MG/DL
MICROALBUMIN UR-MCNC: 47 MG/L
MICROALBUMIN/CREAT UR: 26.7 MG/G CR (ref 0–25)
NONHDLC SERPL-MCNC: 117 MG/DL
PHOSPHATE SERPL-MCNC: 3.2 MG/DL (ref 2.5–4.5)
POTASSIUM BLD-SCNC: 4.2 MMOL/L (ref 3.4–5.3)
PROT SERPL-MCNC: 6.4 G/DL (ref 6.8–8.8)
SODIUM SERPL-SCNC: 140 MMOL/L (ref 133–144)
T4 FREE SERPL-MCNC: 1.34 NG/DL (ref 0.76–1.46)
TRIGL SERPL-MCNC: 111 MG/DL
TSH SERPL DL<=0.005 MIU/L-ACNC: 0.29 MU/L (ref 0.4–4)

## 2022-01-07 PROCEDURE — 84439 ASSAY OF FREE THYROXINE: CPT

## 2022-01-07 PROCEDURE — 80061 LIPID PANEL: CPT

## 2022-01-07 PROCEDURE — 83036 HEMOGLOBIN GLYCOSYLATED A1C: CPT

## 2022-01-07 PROCEDURE — 36415 COLL VENOUS BLD VENIPUNCTURE: CPT

## 2022-01-07 PROCEDURE — 80053 COMPREHEN METABOLIC PANEL: CPT

## 2022-01-07 PROCEDURE — 82248 BILIRUBIN DIRECT: CPT

## 2022-01-07 PROCEDURE — 84100 ASSAY OF PHOSPHORUS: CPT

## 2022-01-07 PROCEDURE — 82043 UR ALBUMIN QUANTITATIVE: CPT

## 2022-01-07 PROCEDURE — 84443 ASSAY THYROID STIM HORMONE: CPT

## 2022-07-22 NOTE — TELEPHONE ENCOUNTER
Provider requesting phone call to patient -  Diabetic patient, follows with endocrinology at another health care facility.  Find out if she is taking a statin (cholesterol medication)  and if not, why?    Need to document reason in her chart.    left message for patient to return my phone call.     Rudy Lopez is a 72 year old male patient.  Chief Complaint:     Patient Active Problem List   Diagnosis   • Right hip pain     History reviewed. No pertinent past medical history.  Current Facility-Administered Medications   Medication Dose Route Frequency Provider Last Rate Last Admin   • vancomycin (VANCOCIN) 750 mg in sodium chloride 0.9 % 250 mL IVPB  750 mg Intravenous 2 times per day Katherine Saenz .7 mL/hr at 07/21/22 2230 750 mg at 07/21/22 2230   • LORazepam (ATIVAN) injection 1 mg  1 mg Intravenous Once William Prather MD       • tiZANidine (ZANAFLEX) tablet 4 mg  4 mg Oral 3 times per day Mayuri Moreno MD   4 mg at 07/22/22 0526   • sodium chloride (PF) 0.9 % injection 10 mL  10 mL Injection Once William Prather MD       • linaGLIPtin (TRADJENTA) tablet 5 mg  5 mg Oral Daily Mike Wheeler NP   5 mg at 07/21/22 0934   • insulin glargine (LANTUS) injection 18 Units  18 Units Subcutaneous Nightly Mike Wheeler NP   18 Units at 07/21/22 2230   • LORazepam (ATIVAN) injection 1 mg  1 mg Intravenous Once PRN William Prather MD       • spironolactone (ALDACTONE) tablet 25 mg  25 mg Oral Daily Alba Berman, APNP   25 mg at 07/21/22 0934   • empagliflozin (JARDIANCE) tablet 10 mg  10 mg Oral Daily Zoey Galvin MD   10 mg at 07/21/22 0935   • VANCOMYCIN - PHARMACIST MONITORED Misc   Does not apply See Admin Instructions Katherine Saenz MD       • triamcinolone (ARISTOCORT) 0.1 % cream   Topical TID Lakesha Del Cid MD   Given at 07/21/22 2232   • cetirizine (ZyrTEC) tablet 10 mg  10 mg Oral Daily PRN Lakesha Del Cid MD   10 mg at 07/16/22 1741   • WARFARIN - PHARMACIST MONITORED Misc   Does not apply See Admin Instructions Lakesha Del Cid MD       • sodium chloride (PF) 0.9 % injection 10 mL  10 mL Injection PRN Malcolm Gracia,        • sodium chloride (PF) 0.9 % injection 10 mL  10 mL Injection 2 times per day Malcolm Gracia, DO   10 mL at 07/21/22 2229   • sodium  chloride (PF) 0.9 % injection 20 mL  20 mL Injection PRN Malcolm Gracia,        • pantoprazole (PROTONIX) EC tablet 40 mg  40 mg Oral Nightly Adi Jacobson MD   40 mg at 07/21/22 2226   • lidocaine (LIDOCARE) 4 % patch 1 patch  1 patch Transdermal Daily Jose Carlos Ruiz MD   1 patch at 07/21/22 0945   • metoPROLOL succinate (TOPROL-XL) ER tablet 100 mg  100 mg Oral BID Kalie Child PA-C   100 mg at 07/21/22 2227   • sodium chloride 0.9% infusion   Intravenous Continuous PRN Quentin Horton MD   Completed at 07/12/22 0800   • sodium chloride 0.9% infusion   Intravenous Continuous PRN Quentin Horton MD   Stopped at 07/13/22 0800   • rosuvastatin (CRESTOR) tablet 20 mg  20 mg Oral Nightly Kalie Child PA-C   20 mg at 07/21/22 2331   • losartan (COZAAR) tablet 12.5 mg  12.5 mg Oral Daily Kalie Child PA-C   12.5 mg at 07/21/22 0934   • HYDROmorphone (DILAUDID) injection 0.2 mg  0.2 mg Intravenous Q4H PRN Vito Doyle MD   0.2 mg at 07/20/22 0036   • ondansetron (ZOFRAN) injection 4 mg  4 mg Intravenous BID PRN Boaz Ambriz MD       • prochlorperazine (COMPAZINE) injection 5 mg  5 mg Intravenous Q4H PRN Boaz Ambriz MD       • acetaminophen (TYLENOL) tablet 650 mg  650 mg Oral Q4H PRN Boaz Ambriz MD   650 mg at 07/18/22 2128   • polyethylene glycol (MIRALAX) packet 17 g  17 g Oral Daily PRN Boaz Ambriz MD       • docusate sodium-sennosides (SENOKOT S) 50-8.6 MG 2 tablet  2 tablet Oral Daily PRN Boaz Ambriz MD   2 tablet at 07/15/22 2040   • bisacodyl (DULCOLAX) suppository 10 mg  10 mg Rectal Daily PRN Boaz Ambriz MD       • magnesium hydroxide (MILK OF MAGNESIA) 400 MG/5ML suspension 30 mL  30 mL Oral Daily PRN Boaz Ambriz MD       • aluminum-magnesium hydroxide-simethicone (MAALOX) 200-200-20 MG/5ML suspension 30 mL  30 mL Oral Q4H PRN Boaz Ambriz MD   30 mL at 07/04/22 0548   • sodium chloride 0.9 % flush bag 25 mL  25 mL Intravenous PRN Boaz  AMEYA Ambriz MD       • sodium chloride (NORMAL SALINE) 0.9 % bolus 500 mL  500 mL Intravenous PRN Boaz Ambriz MD   Completed at 07/08/22 2117   • Potassium Standard Replacement Protocol   Does not apply See Admin Instructions Boaz Ambriz MD       • Magnesium Standard Replacement Protocol   Does not apply See Admin Instructions Boaz Ambriz MD       • Phosphorus Standard Replacement Protocol   Does not apply See Admin Instructions Boaz Ambriz MD       • hydrALAZINE (APRESOLINE) injection 10 mg  10 mg Intravenous Q6H PRN Boaz Ambriz MD       • insulin lispro (ADMELOG,HumaLOG) - Correction Dose   Subcutaneous Nightly Reuben Lazo MD   2 Units at 07/15/22 2212   • insulin lispro (ADMELOG,HumaLOG) - Correction Dose   Subcutaneous TID WC Reuben Lazo MD   1 Units at 07/18/22 0852   • HYDROcodone-acetaminophen (NORCO) 5-325 MG per tablet 1 tablet  1 tablet Oral Q4H PRN Vito Doyle MD   1 tablet at 07/21/22 2330    Or   • HYDROcodone-acetaminophen (NORCO) 5-325 MG per tablet 2 tablet  2 tablet Oral Q4H PRN Vito Doyle MD   2 tablet at 07/18/22 0034   • tamsulosin (FLOMAX) capsule 0.4 mg  0.4 mg Oral Daily PC Reuben Lazo MD   0.4 mg at 07/21/22 0934   • dextrose 50 % injection 25 g  25 g Intravenous PRN Boaz Ambriz MD       • dextrose 50 % injection 12.5 g  12.5 g Intravenous PRN Boaz Ambriz MD       • glucagon (GLUCAGEN) injection 1 mg  1 mg Intramuscular PRN Boaz Ambriz MD       • dextrose (GLUTOSE) 40 % gel 15 g  15 g Oral PRN Boaz Ambriz MD       • dextrose (GLUTOSE) 40 % gel 30 g  30 g Oral PRN Boaz Ambriz MD         ALLERGIES:  No Known Allergies  Principal Problem:    Right hip pain    Blood pressure 113/67, pulse 95, temperature 98 °F (36.7 °C), temperature source Oral, resp. rate 18, height 5' 9.5\" (1.765 m), weight 100 kg (220 lb 7.4 oz), SpO2 95 %.    Subjective:  Symptoms:  Stable.  He reports anxiety.  No shortness of breath, malaise, cough, chest  pain, weakness, headache, chest pressure, anorexia or diarrhea.    Diet:  Adequate intake.  No nausea or vomiting.    Activity level: Impaired due to pain.    Pain:  He complains of pain that is moderate.  He reports pain is unchanged.  Pain is well controlled and requiring pain medication.      Objective:  General Appearance:  Comfortable, in no acute distress, well-appearing and not in pain.    Vital signs: (most recent): Blood pressure 109/55, pulse 72, temperature 98 °F (36.7 °C), temperature source Oral, resp. rate 18, height 5' 9.5\" (1.765 m), weight 100 kg (220 lb 7.4 oz), SpO2 95 %.  Vital signs are normal.  No fever.    Output: Producing urine and producing stool.    HEENT: Normal HEENT exam.    Lungs:  Normal effort and normal respiratory rate.  Breath sounds clear to auscultation.    Heart: Normal rate.  Regular rhythm.  S1 normal and S2 normal.    Neurological: Patient is alert and oriented to person, place and time.  Normal strength.    Skin:  Warm and pale.      Assessment:    Condition: In stable condition.  Unchanged.   (There is no previous surgical history on file.    WBC (K/mcL)       Date                     Value                 07/21/2022               17.1 (H)         ----------  RBC (mil/mcL)       Date                     Value                 07/21/2022               3.21 (L)         ----------  HCT (%)       Date                     Value                 07/22/2022               29.3 (L)         ----------  HGB (g/dL)       Date                     Value                 07/22/2022               9.2 (L)          ----------  PLT (K/mcL)       Date                     Value                 07/21/2022               420              ----------    Sodium (mmol/L)       Date                     Value                 07/21/2022               135              ----------  Potassium (mmol/L)       Date                     Value                 07/21/2022               3.9               ----------  Chloride (mmol/L)       Date                     Value                 07/21/2022               102              ----------  Glucose (mg/dL)       Date                     Value                 07/21/2022               96               ----------  Calcium (mg/dL)       Date                     Value                 07/21/2022               9.2              ----------  Carbon Dioxide (mmol/L)       Date                     Value                 07/21/2022               27               ----------  BUN (mg/dL)       Date                     Value                 07/21/2022               27 (H)           ----------  Creatinine (mg/dL)       Date                     Value                 07/21/2022               0.96             ----------    GOT/AST (Units/L)       Date                     Value                 07/21/2022               45 (H)           ----------  GPT/ALT (Units/L)       Date                     Value                 07/21/2022               25               ----------  No results found for: GGTP  Alkaline Phosphatase (Units/L)       Date                     Value                 07/21/2022               411 (H)          ----------  Bilirubin, Total (mg/dL)       Date                     Value                 07/21/2022               0.8              ----------    INR (no units)       Date                     Value                 07/22/2022               1.9).   7/6/22 CT ANGIOGRAM CHEST PE IMAGING- 3D, CT ABDOMEN PELVIS W CONTRAST      HISTORY:  acute hypoxia. Elevated lactic acid and WBC     COMPARISONS:  CT obtained on 7/4/2022.     TECHNIQUE: 1.25 mm thick axial images through the chest and 3.75 mm thick  axial images through the abdomen and pelvis after intravenous  administration of 100 cc of Omnipaque 350. Coronal and sagittal  reconstructions. MIPS.     FINDINGS:       Chest:     Lymph nodes and soft tissues: No definitive adenopathy.     Heart and pulmonary vessels: Heart is  prominent. Coronary artery  calcifications. No pericardial effusion. No pulmonary embolism. Evaluation  is mildly degraded by motion artifact.     Lungs: Trace right pleural effusion. Mild diffuse interstitial prominence  and mild groundglass haziness. Underlying emphysematous changes are also  suspected. Findings are nonspecific and could be related to component of  congestive heart failure. Correlate with symptoms. Subtle pneumonitis would  be difficult to exclude. Bibasilar consolidations with air bronchograms  could be related to pneumonitis.     Abdomen pelvis: Evaluation is degraded by arm positioning resulting in  significant scatter artifact.  Liver: No definitive abnormality.     Spleen: No definitive abnormality.     Pancreas: Atrophic pancreas. Calcification along the pancreas is probably  vascular.     Gallbladder: Calcified gallstones.     Adrenal glands: Normal     Kidneys and bladder: Symmetric enhancement involving the kidneys. No  hydronephrosis or hydroureter. Probable contrast within the bladder. Small  hypoattenuating probable cyst within the inferior pole of the right kidney.     Bowel: Small bowel and colon are normal in caliber. Uncomplicated  diverticulosis is noted. Mild thickening of the rectum is suspected.  Correlate with physical exam. Appendix is not definitively identified.     Fluid and air: No free fluid or free air in the abdomen or pelvis.     Lymph nodes: Multiple subcentimeter mesenteric and retroperitoneal lymph  nodes are noted.     Vessels: Atherosclerotic calcifications are noted.     Osseous structures and soft tissues: Fat-containing umbilical hernia. Air  within the ventral abdominal wall is probably related to medication  administration. Correlate clinically. Mild subcutaneous edema along the  right and left hips laterally. Advanced osteoarthritis of the hips.  Multilevel degenerative disc disease and facet arthropathy.     IMPRESSION:   1. Bibasilar pulmonary opacities  may be atelectasis or pneumonitis.  Correlate with symptoms. Some air bronchograms are noted, which favors  pneumonitis. Trace right pleural effusion. Follow-up chest CT is  recommended after acute symptoms have resolved for reevaluation of the  pulmonary opacities.  2. Multiple gallstones without CT findings to suggest cholecystitis.  Atrophic pancreas.  3. Additional findings as described. Possible mild thickening of the  rectum. Correlate with symptoms and physical exam.        7/6/22 MRI LUMBAR SPINE W WO  CONTRAST     CLINICAL INDICATION: 72 years-old Male, presenting history of Low back  pain, progressive neurologic deficit.     COMPARISON:  Radiograph of the lumbar spine 7/3/2022.     FINDINGS: Motion artifact limits evaluation of the postcontrast images.     POSTSURGICAL FINDINGS:  None.     VERTEBRAE: 5 lumbar-type vertebral bodies are present. Vertebral body  heights are preserved. Mild levoconvex curvature is incompletely evaluated.  Trace anterolisthesis of L4 on L5.      MARROW:  Osseous hemangiomas in the L4 and L5 vertebral bodies. Small  endplate osteophytes and mild degenerative marrow signal changes at  multiple levels. No destructive osseus lesions.     DISCS:  Mild disc height loss at L2-L3 on the right. Remaining disc heights  are maintained.     SPINAL CANAL:  Conus medullaris terminates at L1. Distal cord and nerve  roots are of normal caliber. Within the limits of motion artifact, no  abnormal enhancement in the lumbar spinal canal.     PARASPINAL SOFT TISSUES:  Mild fatty atrophy of the paraspinal muscles.     DEGENERATIVE CHANGES:     T12-L1:  Minimal, diffuse disc bulge without spinal canal or neural  foraminal narrowing.      L1-L2: Minimal, diffuse disc bulge with mild bilateral facet hypertrophy  without spinal canal or neural foraminal narrowing.      L2-L3:  Moderate disc bulge asymmetric to the right with mild to moderate  right facet hypertrophy results in mild right lateral recess  and mild to  moderate right neural foraminal narrowing. No significant left neural  foraminal narrowing.      L3-L4:  Moderate disc bulge asymmetric to right with moderate right and  mild left facet hypertrophy result in mild right lateral recess and  moderate right neural foraminal narrowing. No significant left neural  foraminal narrowing.      L4-L5:  Moderate to large, diffuse disc bulge with severe bilateral facet  hypertrophy and prominence of ligamentum flavum result in moderate to  severe spinal canal and mild to moderate bilateral neural foraminal  narrowings.      L5-S1:  Moderate to large disc bulge asymmetric to the left with a  superimposed central protrusion as well as moderate right and severe left  facet hypertrophy result in moderate to severe left lateral recess and mild  right and moderate to severe left neural foraminal narrowing.       OTHER: Colonic diverticulosis is partially imaged.     IMPRESSION: This study was terminated prior to completion due to patient  discomfort. Sagittal T1 postcontrast images were not obtained.     1.  Multilevel degenerative changes, as described.     2.  At L4-L5, there is moderate to severe spinal canal stenosis.     3.  At L5-S1, there are moderate to severe left lateral recess and neural  foraminal narrowings.        72 year old man  Acute and chronic right hip pain   New onset a fib  Moderate to severe multilevel degenerative spondylosis  DJD   L4-L5 Spinal stenosis (moderate to severe)  Multilevel lumbosacral spondylosis  Respiratory failure   Gram-positive bacteremia, MSSA  Acute hypoxemic respiratory failure  Acute encephalopathy, ?hypoxemic, pneumonia, Acute MI  Lactic acid elevation  Elevated procalcitonin  New onset atrial fibrillation - during this hospitalization    - [id number] - 3 - on therapeutic lovenox  Elevated BUN  ?dehydration vs bleeding  Poorly controlled dm - hba1c 13  Right back/hip pain      Tylenol 650 mg po q 4 hours prn  lovenox 150 mg  sq q 12 hours  Hydrocodone 5/325 mg 1-2 tabs po q 4 hours prn  Dilaudid 0.2 mg IV q 4 hours prn  Lidocaine 4 % patch  zanaflex 2 mg po q 8 hours prn increased to 4 mg po tid prn 7/20/22 increased to 4 mg po tid 7/21/22    Vas 6/10  Slept overnight  Not drowsy  Eats OK     Add gabapentin 100 mg po tid    Mayuri Moreno MD       Patent

## 2022-12-07 NOTE — PROGRESS NOTES
"History:   Mary Anne Oates is a 57 year old female who was referred to general surgery by [facility] for cholelithiasis.  Since October she has been suffering from recurrent upper GI symptoms.  She will have postprandial upper abdominal pain with radiation to her back.  She describes the pain as a burning ache.  It is starting to occur after every meal.  Sometimes she will be woken up in the middle the night with these symptoms.  It is associated with belching and nausea and vomiting.  She was started on omeprazole after consultation with [facility].  Her records and office notes were reviewed.  The omeprazole did not help her symptoms.  Laying on her left side seems to help along with an over-the-counter \"acid reducing medicine.\"  Throughout this process due to her symptoms, she has lost 28 pounds without trying.    Allergies:  Patient has no known allergies.    Past medical history:  Hypothyroidism  DM  Morbid obesity    Past surgical history:  Tubal ligation  Thyroidectomy    Current medications:      cholecalciferol 50 MCG (2000 UT) tablet, Take by mouth every 24 hours, Disp: , Rfl:      insulin glargine (LANTUS SOLOSTAR) 100 UNIT/ML pen, Inject 15 Units Subcutaneous, Disp: , Rfl:      insulin lispro (HUMALOG KWIKPEN) 100 UNIT/ML soln, 3 units before breakfast, 3 units before lunch, 3 units before dinner and increase as directed Max dose 30 units, Disp: 15 mL, Rfl: 3     Insulin Lispro (HUMALOG KWIKPEN) 200 UNIT/ML soln, , Disp: , Rfl:      insulin pen needle 30G X 8 MM, Use 4 pen needles daily or as directed., Disp: 100 each, Rfl: 3     losartan (COZAAR) 50 MG tablet, Take by mouth every 24 hours, Disp: , Rfl:      ondansetron (ZOFRAN) 4 MG tablet, Take 1 tablet (4 mg) by mouth every 8 hours as needed for nausea or vomiting, Disp: 30 tablet, Rfl: 1    Family history:  Mother had breast cancer and grandmother esophageal cancer.  Father had lung cancer.  Denies a family history of anesthesia problems    Social " "history:  Denies tobacco, alcohol and illicit drug use.  Has 4 children.    Review of Systems:  General: No complaints or constitutional symptoms  Skin: No complaints or symptoms   Hematologic/Lymphatic: No symptoms or complaints  Psychiatric: No symptoms or complaints  Endocrine: No excessive fatigue, no hypermetabolic symptoms reported  Respiratory: No cough, shortness of breath, or wheezing  Cardiovascular: No chest pain or dyspnea on exertion  Gastrointestinal: As per HPI  Musculoskeletal: No recent injuries reported  Neurological: No focal neurologic defects reported.      Exam:  /70   Ht 1.626 m (5' 4\")   Wt 111.1 kg (245 lb)   BMI 42.05 kg/m    Body mass index is 42.05 kg/m .  General: Alert, cooperative, appears stated age   Skin: Skin color, texture, turgor normal, no rashes or lesions   Lymphatic: No obvious adenopathy, no swelling   Eyes: No scleral icterus, pupils equal  HENT: No traumatic injury to the head or face, no gross abnormalities  Lungs: Normal respiratory effort, breath sounds equal bilaterally  Heart: Regular rate and rhythm  Abdomen: Obese, soft, nondistended, tender to palpation in epigastrium and right upper quadrant  Musculoskeletal: No obvious swelling or deformities  Neurologic: Grossly intact      Imaging:   Pertinent images personally reviewed by myself and discussed with the patient.    Radiology report from [facility] 12/2/22:  EXAM: RIGHT UPPER QUADRANT ABDOMINAL ULTRASOUND, LIMITED  CLINICAL INFORMATION: Postprandial right upper quadrant pain.  TECHNICAL INFORMATION: Right upper quadrant ultrasound limited.  COMPARISON: None.    INTERPRETATION: Liver measures 15 cm in length demonstrating increased echotexture, consistent with hepatic steatosis. No focal liver lesion. No intra or extrahepatic bile duct dilatation; common duct measures 5 mm.  Gallbladder is visualized containing gallstones. No gallbladder wall thickening. Positive sonographic Castro's sign.  Pancreas, right " kidney, aorta and inferior vena cava are unremarkable.    CONCLUSION:  1. Several gallstones in the gallbladder lumen. No gallbladder wall thickening or bile duct dilatation. Positive sonographic Castro's sign.  2. Hepatic steatosis without focal lesion.    My interpretation:  Small dependent gallstones    Assessment/Plan:   Mary Anne Oates is a morbidly obese 57 year old female with signs and symptoms consistent with biliary colic.  I have explained the pathophysiology of gallbladder disease in detail as well as the surgical versus non-operative management strategies.  The risks of surgery and anesthesia include, but are not limited to, bleeding, infection, injury to surrounding structures, the need to convert to an open procedure, blood clots, stroke, heart attack and death.  Specifically we discussed the risk of damage to the common bile duct and hepatic vessels, and the complications that may arise from damage to these structures.  Additionally, the risks of non-operative management were discussed which include, but are not limited to, infection, recurrent pain, sepsis and death.     She understands everything which was discussed and is interested in pursuing surgical management.  Therefore, preoperative orders have been placed for laparoscopic cholecystectomy under general anesthesia.  She would be discharged home the same day.  With her comorbidities, I would like for her to have a preoperative physical.  Postoperative recovery and restrictions were discussed.  She will work with my  to pick a date for surgery.    Betty Scott DO  General Surgeon  [facility]  Surgery [address] [address]  Office: [phone]  Employed by - [facility]

## 2022-12-08 ENCOUNTER — OFFICE VISIT (OUTPATIENT)
Dept: SURGERY | Facility: CLINIC | Age: 57
End: 2022-12-08
Payer: COMMERCIAL

## 2022-12-08 VITALS
HEIGHT: 64 IN | DIASTOLIC BLOOD PRESSURE: 70 MMHG | WEIGHT: 245 LBS | SYSTOLIC BLOOD PRESSURE: 128 MMHG | BODY MASS INDEX: 41.83 KG/M2

## 2022-12-08 DIAGNOSIS — R11.0 NAUSEA: ICD-10-CM

## 2022-12-08 DIAGNOSIS — E66.01 MORBID OBESITY (H): ICD-10-CM

## 2022-12-08 DIAGNOSIS — K80.50 BILIARY COLIC: Primary | ICD-10-CM

## 2022-12-08 PROBLEM — I10 HYPERTENSION: Status: ACTIVE | Noted: 2022-12-08

## 2022-12-08 PROBLEM — R10.10 UPPER ABDOMINAL PAIN: Status: ACTIVE | Noted: 2022-12-08

## 2022-12-08 PROBLEM — R13.10 DYSPHAGIA: Status: ACTIVE | Noted: 2022-12-08

## 2022-12-08 PROBLEM — E66.9 OBESITY: Status: ACTIVE | Noted: 2022-12-08

## 2022-12-08 PROBLEM — E89.0 OTHER POSTABLATIVE HYPOTHYROIDISM: Status: ACTIVE | Noted: 2022-12-08

## 2022-12-08 PROCEDURE — 99204 OFFICE O/P NEW MOD 45 MIN: CPT | Performed by: SURGERY

## 2022-12-08 RX ORDER — ONDANSETRON 4 MG/1
4 TABLET, FILM COATED ORAL EVERY 8 HOURS PRN
Qty: 30 TABLET | Refills: 1 | Status: SHIPPED | OUTPATIENT
Start: 2022-12-08 | End: 2022-12-16

## 2022-12-08 RX ORDER — LOSARTAN POTASSIUM 50 MG/1
50 TABLET ORAL AT BEDTIME
COMMUNITY
Start: 2022-11-11

## 2022-12-08 RX ORDER — INSULIN GLARGINE 100 [IU]/ML
15 INJECTION, SOLUTION SUBCUTANEOUS AT BEDTIME
COMMUNITY
Start: 2022-09-13

## 2022-12-08 RX ORDER — INSULIN LISPRO 200 [IU]/ML
INJECTION, SOLUTION SUBCUTANEOUS
Status: ON HOLD | COMMUNITY
Start: 2022-11-11 | End: 2022-12-20

## 2022-12-08 NOTE — LETTER
"    12/8/2022         RE: Mary Anne Oates  [address] [address]        Dear Colleague,    Thank you for referring your patient, Mary Anne Oates, to the [facility] [place]. Please see a copy of my visit note below.    History:   Mary Anne Oates is a 57 year old female who was referred to general surgery by [facility] for cholelithiasis.  Since October she has been suffering from recurrent upper GI symptoms.  She will have postprandial upper abdominal pain with radiation to her back.  She describes the pain as a burning ache.  It is starting to occur after every meal.  Sometimes she will be woken up in the middle the night with these symptoms.  It is associated with belching and nausea and vomiting.  She was started on omeprazole after consultation with [facility].  Her records and office notes were reviewed.  The omeprazole did not help her symptoms.  Laying on her left side seems to help along with an over-the-counter \"acid reducing medicine.\"  Throughout this process due to her symptoms, she has lost 28 pounds without trying.    Allergies:  Patient has no known allergies.    Past medical history:  Hypothyroidism  DM  Morbid obesity    Past surgical history:  Tubal ligation  Thyroidectomy    Current medications:      cholecalciferol 50 MCG (2000 UT) tablet, Take by mouth every 24 hours, Disp: , Rfl:      insulin glargine (LANTUS SOLOSTAR) 100 UNIT/ML pen, Inject 15 Units Subcutaneous, Disp: , Rfl:      insulin lispro (HUMALOG KWIKPEN) 100 UNIT/ML soln, 3 units before breakfast, 3 units before lunch, 3 units before dinner and increase as directed Max dose 30 units, Disp: 15 mL, Rfl: 3     Insulin Lispro (HUMALOG KWIKPEN) 200 UNIT/ML soln, , Disp: , Rfl:      insulin pen needle 30G X 8 MM, Use 4 pen needles daily or as directed., Disp: 100 each, Rfl: 3     losartan (COZAAR) 50 MG tablet, Take by mouth every 24 hours, Disp: , Rfl:      ondansetron (ZOFRAN) 4 MG " "tablet, Take 1 tablet (4 mg) by mouth every 8 hours as needed for nausea or vomiting, Disp: 30 tablet, Rfl: 1    Family history:  Mother had breast cancer and grandmother esophageal cancer.  Father had lung cancer.  Denies a family history of anesthesia problems    Social history:  Denies tobacco, alcohol and illicit drug use.  Has 4 children.    Review of Systems:  General: No complaints or constitutional symptoms  Skin: No complaints or symptoms   Hematologic/Lymphatic: No symptoms or complaints  Psychiatric: No symptoms or complaints  Endocrine: No excessive fatigue, no hypermetabolic symptoms reported  Respiratory: No cough, shortness of breath, or wheezing  Cardiovascular: No chest pain or dyspnea on exertion  Gastrointestinal: As per HPI  Musculoskeletal: No recent injuries reported  Neurological: No focal neurologic defects reported.      Exam:  /70   Ht 1.626 m (5' 4\")   Wt 111.1 kg (245 lb)   BMI 42.05 kg/m    Body mass index is 42.05 kg/m .  General: Alert, cooperative, appears stated age   Skin: Skin color, texture, turgor normal, no rashes or lesions   Lymphatic: No obvious adenopathy, no swelling   Eyes: No scleral icterus, pupils equal  HENT: No traumatic injury to the head or face, no gross abnormalities  Lungs: Normal respiratory effort, breath sounds equal bilaterally  Heart: Regular rate and rhythm  Abdomen: Obese, soft, nondistended, tender to palpation in epigastrium and right upper quadrant  Musculoskeletal: No obvious swelling or deformities  Neurologic: Grossly intact      Imaging:   Pertinent images personally reviewed by myself and discussed with the patient.    Radiology report from [facility] 12/2/22:  EXAM: RIGHT UPPER QUADRANT ABDOMINAL ULTRASOUND, LIMITED  CLINICAL INFORMATION: Postprandial right upper quadrant pain.  TECHNICAL INFORMATION: Right upper quadrant ultrasound limited.  COMPARISON: None.    INTERPRETATION: Liver measures 15 cm in length demonstrating increased " echotexture, consistent with hepatic steatosis. No focal liver lesion. No intra or extrahepatic bile duct dilatation; common duct measures 5 mm.  Gallbladder is visualized containing gallstones. No gallbladder wall thickening. Positive sonographic Castro's sign.  Pancreas, right kidney, aorta and inferior vena cava are unremarkable.    CONCLUSION:  1. Several gallstones in the gallbladder lumen. No gallbladder wall thickening or bile duct dilatation. Positive sonographic Castro's sign.  2. Hepatic steatosis without focal lesion.    My interpretation:  Small dependent gallstones    Assessment/Plan:   Mary Anne Oates is a morbidly obese 57 year old female with signs and symptoms consistent with biliary colic.  I have explained the pathophysiology of gallbladder disease in detail as well as the surgical versus non-operative management strategies.  The risks of surgery and anesthesia include, but are not limited to, bleeding, infection, injury to surrounding structures, the need to convert to an open procedure, blood clots, stroke, heart attack and death.  Specifically we discussed the risk of damage to the common bile duct and hepatic vessels, and the complications that may arise from damage to these structures.  Additionally, the risks of non-operative management were discussed which include, but are not limited to, infection, recurrent pain, sepsis and death.     She understands everything which was discussed and is interested in pursuing surgical management.  Therefore, preoperative orders have been placed for laparoscopic cholecystectomy under general anesthesia.  She would be discharged home the same day.  With her comorbidities, I would like for her to have a preoperative physical.  Postoperative recovery and restrictions were discussed.  She will work with my  to pick a date for surgery.    Betty Scott DO  General Surgeon  [facility]  Surgery [facility]  [address]  [address]  [place] [address]  Office: [phone]  Employed by - [facility]        Again, thank you for allowing me to participate in the care of your patient.        Sincerely,        Betty Scott, DO

## 2022-12-16 ENCOUNTER — OFFICE VISIT (OUTPATIENT)
Dept: FAMILY MEDICINE | Facility: CLINIC | Age: 57
End: 2022-12-16
Payer: COMMERCIAL

## 2022-12-16 VITALS
BODY MASS INDEX: 44.03 KG/M2 | DIASTOLIC BLOOD PRESSURE: 70 MMHG | HEIGHT: 62 IN | WEIGHT: 239.3 LBS | HEART RATE: 100 BPM | TEMPERATURE: 97.5 F | OXYGEN SATURATION: 100 % | RESPIRATION RATE: 17 BRPM | SYSTOLIC BLOOD PRESSURE: 118 MMHG

## 2022-12-16 DIAGNOSIS — Z01.818 PREOP EXAMINATION: Primary | ICD-10-CM

## 2022-12-16 DIAGNOSIS — Z23 NEED FOR VACCINATION: ICD-10-CM

## 2022-12-16 DIAGNOSIS — Z79.4 TYPE 2 DIABETES MELLITUS WITH HYPERGLYCEMIA, WITH LONG-TERM CURRENT USE OF INSULIN (H): ICD-10-CM

## 2022-12-16 DIAGNOSIS — E11.65 TYPE 2 DIABETES MELLITUS WITH HYPERGLYCEMIA, WITH LONG-TERM CURRENT USE OF INSULIN (H): ICD-10-CM

## 2022-12-16 PROBLEM — R10.10 UPPER ABDOMINAL PAIN: Status: RESOLVED | Noted: 2022-12-08 | Resolved: 2022-12-16

## 2022-12-16 LAB
ALBUMIN SERPL BCG-MCNC: 3.8 G/DL (ref 3.5–5.2)
ALP SERPL-CCNC: 70 U/L (ref 35–104)
ALT SERPL W P-5'-P-CCNC: 13 U/L (ref 10–35)
ANION GAP SERPL CALCULATED.3IONS-SCNC: 14 MMOL/L (ref 7–15)
AST SERPL W P-5'-P-CCNC: 16 U/L (ref 10–35)
BILIRUB SERPL-MCNC: 0.3 MG/DL
BUN SERPL-MCNC: 26 MG/DL (ref 6–20)
CALCIUM SERPL-MCNC: 9.8 MG/DL (ref 8.6–10)
CHLORIDE SERPL-SCNC: 106 MMOL/L (ref 98–107)
CREAT SERPL-MCNC: 1.06 MG/DL (ref 0.51–0.95)
DEPRECATED HCO3 PLAS-SCNC: 19 MMOL/L (ref 22–29)
ERYTHROCYTE [DISTWIDTH] IN BLOOD BY AUTOMATED COUNT: 13 % (ref 10–15)
GFR SERPL CREATININE-BSD FRML MDRD: 61 ML/MIN/1.73M2
GLUCOSE SERPL-MCNC: 117 MG/DL (ref 70–99)
HBA1C MFR BLD: 6.3 % (ref 0–5.6)
HCT VFR BLD AUTO: 33.1 % (ref 35–47)
HGB BLD-MCNC: 10.7 G/DL (ref 11.7–15.7)
MCH RBC QN AUTO: 25.4 PG (ref 26.5–33)
MCHC RBC AUTO-ENTMCNC: 32.3 G/DL (ref 31.5–36.5)
MCV RBC AUTO: 79 FL (ref 78–100)
PLATELET # BLD AUTO: 518 10E3/UL (ref 150–450)
POTASSIUM SERPL-SCNC: 4.1 MMOL/L (ref 3.4–5.3)
PROT SERPL-MCNC: 6.5 G/DL (ref 6.4–8.3)
RBC # BLD AUTO: 4.21 10E6/UL (ref 3.8–5.2)
SODIUM SERPL-SCNC: 139 MMOL/L (ref 136–145)
WBC # BLD AUTO: 12.1 10E3/UL (ref 4–11)

## 2022-12-16 PROCEDURE — 90471 IMMUNIZATION ADMIN: CPT | Performed by: FAMILY MEDICINE

## 2022-12-16 PROCEDURE — 90715 TDAP VACCINE 7 YRS/> IM: CPT | Performed by: FAMILY MEDICINE

## 2022-12-16 PROCEDURE — 83036 HEMOGLOBIN GLYCOSYLATED A1C: CPT | Performed by: FAMILY MEDICINE

## 2022-12-16 PROCEDURE — 0124A COVID-19 VACCINE BIVALENT BOOSTER 12+ (PFIZER): CPT | Performed by: FAMILY MEDICINE

## 2022-12-16 PROCEDURE — 80053 COMPREHEN METABOLIC PANEL: CPT | Performed by: FAMILY MEDICINE

## 2022-12-16 PROCEDURE — 85027 COMPLETE CBC AUTOMATED: CPT | Performed by: FAMILY MEDICINE

## 2022-12-16 PROCEDURE — 91312 COVID-19 VACCINE BIVALENT BOOSTER 12+ (PFIZER): CPT | Performed by: FAMILY MEDICINE

## 2022-12-16 PROCEDURE — 99204 OFFICE O/P NEW MOD 45 MIN: CPT | Mod: 25 | Performed by: FAMILY MEDICINE

## 2022-12-16 PROCEDURE — 36415 COLL VENOUS BLD VENIPUNCTURE: CPT | Performed by: FAMILY MEDICINE

## 2022-12-16 RX ORDER — LEVOTHYROXINE SODIUM 25 UG/1
25 TABLET ORAL DAILY
Status: ON HOLD | COMMUNITY
End: 2022-12-20

## 2022-12-16 NOTE — H&P (VIEW-ONLY)
[facility]  [address] [address]  Phone: [phone]  Fax: [phone]  Primary Provider: Anthony Richard  Pre-op Performing Provider: HELDER TUCKER       PREOPERATIVE EVALUATION:  Today's date: 12/16/2022    Mary Anne Oates is a 57 year old female who presents for a preoperative evaluation.    Surgical Information:  Surgery/Procedure:  Gallbladder Removal  Surgery Location: [facility]  Surgeon:   Surgery Date: 12/20/2022  Time of Surgery: 10:10am  Where patient plans to recover: At home with family  Fax number for surgical facility: Note does not need to be faxed, will be available electronically in Epic.    Type of Anesthesia Anticipated: General    Assessment & Plan     The proposed surgical procedure is considered LOW risk.    (Z01.818) Preop examination  (primary encounter diagnosis)  Comment: For cholecystectomy  Plan: CBC with platelets, Comprehensive metabolic         panel             (E11.65,  Z79.4) Type 2 diabetes mellitus with hyperglycemia, with long-term current use of insulin (H)  Comment: Well-controlled A1c at 6.3  Plan: Hemoglobin A1c             (Z23) Need for vaccination  Comment:    Plan: TDAP VACCINE (Adacel, Boostrix)  [[id number]],         COVID-19 VACCINE BIVALENT BOOSTER 12+ (PFIZER)             PLAN:  1.  Tdap and Pfizer COVID booster  2.  A1c, comprehensive metabolic profile and CBC reviewed, of note there are some slight though not significant abnormalities see below.  3.  The evening prior to surgery the patient will take 8 units rather than 15 units of insulin  4.  Patient is otherwise cleared for surgery                 Medication Instructions:  Please see under plan    RECOMMENDATION:  APPROVAL GIVEN to proceed with proposed procedure, without further diagnostic evaluation.            Subjective     HPI related to upcoming procedure: Patient comes in for preoperative clearance prior to cholecystectomy.   She has been having several months of right upper quadrant pain, she has been losing weight because is hard for her to find anything that she can eat and tolerate.    He is only had 1 surgical procedure in the past there was no issues with anesthesia with that procedure.    Patient has a history of type 2 diabetes mellitus historically this has been well controlled she is actually not even using her Humalog insulin very often because she does not think that she needs it because she doubts her blood sugars are all that high though she is not measuring them lately either.    Patient has not had any other recent change in her health status      Preop Questions 12/16/2022   1. Have you ever had a heart attack or stroke? No   2. Have you ever had surgery on your heart or blood vessels, such as a stent placement, a coronary artery bypass, or surgery on an artery in your head, neck, heart, or legs? No   3. Do you have chest pain with activity? No   4. Do you have a history of  heart failure? No   5. Do you currently have a cold, bronchitis or symptoms of other infection? No   6. Do you have a cough, shortness of breath, or wheezing? No   7. Do you or anyone in your family have previous history of blood clots? No   8. Do you or does anyone in your family have a serious bleeding problem such as prolonged bleeding following surgeries or cuts? No   9. Have you ever had problems with anemia or been told to take iron pills? No   10. Have you had any abnormal blood loss such as black, tarry or bloody stools, or abnormal vaginal bleeding? No   11. Have you ever had a blood transfusion? No   12. Are you willing to have a blood transfusion if it is medically needed before, during, or after your surgery? NO -    13. Have you or any of your relatives ever had problems with anesthesia? No   14. Do you have sleep apnea, excessive snoring or daytime drowsiness? No   15. Do you have any artifical heart valves or other implanted medical  devices like a pacemaker, defibrillator, or continuous glucose monitor? No   16. Do you have artificial joints? No   17. Are you allergic to latex? No   18. Is there any chance that you may be pregnant? No       Health Care Directive:  Patient does not have a Health Care Directive or Living Will: Advance Directive received and scanned. Click on Code in the patient header to view.    Preoperative Review of :   reviewed - controlled substances prescribed by other outside provider(s).       Status of Chronic Conditions:  See problem list for active medical problems.  Problems all longstanding and stable, except as noted/documented.  See ROS for pertinent symptoms related to these conditions.      Review of Systems  CONSTITUTIONAL: NEGATIVE for fever, chills, change in weight  INTEGUMENTARY/SKIN: NEGATIVE for worrisome rashes, moles or lesions  EYES: NEGATIVE for vision changes or irritation  ENT/MOUTH: NEGATIVE for ear, mouth and throat problems  RESP: NEGATIVE for significant cough or SOB  CV: NEGATIVE for chest pain, palpitations or peripheral edema  GI: NEGATIVE for nausea, abdominal pain, heartburn, or change in bowel habits  : NEGATIVE for frequency, dysuria, or hematuria  MUSCULOSKELETAL: NEGATIVE for significant arthralgias or myalgia  NEURO: NEGATIVE for weakness, dizziness or paresthesias  ENDOCRINE: NEGATIVE for temperature intolerance, skin/hair changes  HEME: NEGATIVE for bleeding problems  PSYCHIATRIC: NEGATIVE for changes in mood or affect    Patient Active Problem List    Diagnosis Date Noted     Upper abdominal pain 12/08/2022     Priority: Medium     Other postablative hypothyroidism 12/08/2022     Priority: Medium     Formatting of this note might be different from the original.  Created by Conversion       Obesity 12/08/2022     Priority: Medium     Formatting of this note might be different from the original.  Created by Conversion       Hypertension 12/08/2022     Priority: Medium      Formatting of this note might be different from the original.  Created by Conversion    Replacement Utility updated for latest IMO load       Dysphagia 2022     Priority: Medium     Morbid obesity (H) 2022     Priority: Medium     Type 2 diabetes mellitus with hyperglycemia (H) 2017     Priority: Medium     CARDIOVASCULAR SCREENING; LDL GOAL LESS THAN 160 2010     Priority: Medium      Past Medical History:   Diagnosis Date     Diabetes mellitus type 2, controlled, with complications (H)      Hypothyroidism      Past Surgical History:   Procedure Laterality Date     TUBAL LIGATION      procedure failed     Current Outpatient Medications   Medication Sig Dispense Refill     insulin glargine (LANTUS SOLOSTAR) 100 UNIT/ML pen Inject 15 Units Subcutaneous       insulin lispro (HUMALOG KWIKPEN) 100 UNIT/ML soln 3 units before breakfast, 3 units before lunch, 3 units before dinner and increase as directed  Max dose 30 units 15 mL 3     Insulin Lispro (HUMALOG KWIKPEN) 200 UNIT/ML soln        insulin pen needle 30G X 8 MM Use 4 pen needles daily or as directed. 100 each 3     losartan (COZAAR) 50 MG tablet Take by mouth every 24 hours       cholecalciferol 50 MCG (2000 UT) tablet Take by mouth every 24 hours (Patient not taking: Reported on 2022)       ondansetron (ZOFRAN) 4 MG tablet Take 1 tablet (4 mg) by mouth every 8 hours as needed for nausea or vomiting (Patient not taking: Reported on 2022) 30 tablet 1       No Known Allergies     Social History     Tobacco Use     Smoking status: Former     Packs/day: 1.00     Years: 22.00     Pack years: 22.00     Types: Cigarettes     Quit date: 2005     Years since quittin.2     Smokeless tobacco: Never   Substance Use Topics     Alcohol use: No     Comment: rarely 1-2 every year     Family History   Problem Relation Age of Onset     Hypertension Mother      Diabetes Type 2  Mother      Breast Cancer Mother      Hypertension  "Father      Cancer Father         lung     Diabetes Type 2  Father      Hypertension Maternal Grandmother      Cancer Maternal Grandmother      Hypertension Maternal Grandfather      Cancer Paternal Grandmother      Cerebrovascular Disease No family hx of      C.A.D. No family hx of      Cancer - colorectal No family hx of      Prostate Cancer No family hx of      History   Drug Use No         Objective     /70   Pulse 100   Temp 97.5  F (36.4  C) (Oral)   Resp 17   Ht 1.574 m (5' 1.95\")   Wt 108.5 kg (239 lb 4.8 oz)   SpO2 100%   BMI 43.84 kg/m      Physical Exam    GENERAL APPEARANCE: healthy, alert and no distress     EYES: EOMI, PERRL     HENT: ear canals and TM's normal and nose and mouth without ulcers or lesions     NECK: no adenopathy, no asymmetry, masses, or scars and thyroid normal to palpation     RESP: lungs clear to auscultation - no rales, rhonchi or wheezes     CV: regular rates and rhythm, normal S1 S2, no S3 or S4 and no murmur, click or rub     ABDOMEN:  soft, nontender, no HSM or masses and bowel sounds normal     MS: extremities normal- no gross deformities noted, no evidence of inflammation in joints, FROM in all extremities.     SKIN: no suspicious lesions or rashes     NEURO: Normal strength and tone, sensory exam grossly normal, mentation intact and speech normal     PSYCH: mentation appears normal. and affect normal/bright     LYMPHATICS: No cervical adenopathy    Recent Labs   Lab Test 01/07/22  1003      POTASSIUM 4.2   CR 0.88   A1C 7.1*        Diagnostics:  Recent Results (from the past 168 hour(s))   CBC with platelets    Collection Time: 12/16/22 11:14 AM   Result Value Ref Range    WBC Count 12.1 (H) 4.0 - 11.0 10e3/uL    RBC Count 4.21 3.80 - 5.20 10e6/uL    Hemoglobin 10.7 (L) 11.7 - 15.7 g/dL    Hematocrit 33.1 (L) 35.0 - 47.0 %    MCV 79 78 - 100 fL    MCH 25.4 (L) 26.5 - 33.0 pg    MCHC 32.3 31.5 - 36.5 g/dL    RDW 13.0 10.0 - 15.0 %    Platelet Count 518 (H) " 150 - 450 10e3/uL   Hemoglobin A1c    Collection Time: 12/16/22 11:14 AM   Result Value Ref Range    Hemoglobin A1C 6.3 (H) 0.0 - 5.6 %   Comprehensive metabolic panel    Collection Time: 12/16/22 11:14 AM   Result Value Ref Range    Sodium 139 136 - 145 mmol/L    Potassium 4.1 3.4 - 5.3 mmol/L    Chloride 106 98 - 107 mmol/L    Carbon Dioxide (CO2) 19 (L) 22 - 29 mmol/L    Anion Gap 14 7 - 15 mmol/L    Urea Nitrogen 26.0 (H) 6.0 - 20.0 mg/dL    Creatinine 1.06 (H) 0.51 - 0.95 mg/dL    Calcium 9.8 8.6 - 10.0 mg/dL    Glucose 117 (H) 70 - 99 mg/dL    Alkaline Phosphatase 70 35 - 104 U/L    AST 16 10 - 35 U/L    ALT 13 10 - 35 U/L    Protein Total 6.5 6.4 - 8.3 g/dL    Albumin 3.8 3.5 - 5.2 g/dL    Bilirubin Total 0.3 <=1.2 mg/dL    GFR Estimate 61 >60 mL/min/1.73m2      No EKG required, no history of coronary heart disease, significant arrhythmia, peripheral arterial disease or other structural heart disease.    Revised Cardiac Risk Index (RCRI):  The patient has the following serious cardiovascular risks for perioperative complications:   - No serious cardiac risks = 0 points     RCRI Interpretation: 0 points: Class I (very low risk - 0.4% complication rate)           Signed Electronically by: Haris Mcclure MD  Copy of this evaluation report is provided to requesting physician.

## 2022-12-16 NOTE — PROGRESS NOTES
[facility]  [address] [address]  Phone: [phone]  Fax: [phone]  Primary Provider: Anthony Richard  Pre-op Performing Provider: HELDER TUCKER       PREOPERATIVE EVALUATION:  Today's date: 12/16/2022    Mary Anne Oates is a 57 year old female who presents for a preoperative evaluation.    Surgical Information:  Surgery/Procedure:  Gallbladder Removal  Surgery Location: [facility]  Surgeon:   Surgery Date: 12/20/2022  Time of Surgery: 10:10am  Where patient plans to recover: At home with family  Fax number for surgical facility: Note does not need to be faxed, will be available electronically in Epic.    Type of Anesthesia Anticipated: General    Assessment & Plan     The proposed surgical procedure is considered LOW risk.    (Z01.818) Preop examination  (primary encounter diagnosis)  Comment: For cholecystectomy  Plan: CBC with platelets, Comprehensive metabolic         panel             (E11.65,  Z79.4) Type 2 diabetes mellitus with hyperglycemia, with long-term current use of insulin (H)  Comment: Well-controlled A1c at 6.3  Plan: Hemoglobin A1c             (Z23) Need for vaccination  Comment:    Plan: TDAP VACCINE (Adacel, Boostrix)  [[id number]],         COVID-19 VACCINE BIVALENT BOOSTER 12+ (PFIZER)             PLAN:  1.  Tdap and Pfizer COVID booster  2.  A1c, comprehensive metabolic profile and CBC reviewed, of note there are some slight though not significant abnormalities see below.  3.  The evening prior to surgery the patient will take 8 units rather than 15 units of insulin  4.  Patient is otherwise cleared for surgery                 Medication Instructions:  Please see under plan    RECOMMENDATION:  APPROVAL GIVEN to proceed with proposed procedure, without further diagnostic evaluation.            Subjective     HPI related to upcoming procedure: Patient comes in for preoperative clearance prior to cholecystectomy.   She has been having several months of right upper quadrant pain, she has been losing weight because is hard for her to find anything that she can eat and tolerate.    He is only had 1 surgical procedure in the past there was no issues with anesthesia with that procedure.    Patient has a history of type 2 diabetes mellitus historically this has been well controlled she is actually not even using her Humalog insulin very often because she does not think that she needs it because she doubts her blood sugars are all that high though she is not measuring them lately either.    Patient has not had any other recent change in her health status      Preop Questions 12/16/2022   1. Have you ever had a heart attack or stroke? No   2. Have you ever had surgery on your heart or blood vessels, such as a stent placement, a coronary artery bypass, or surgery on an artery in your head, neck, heart, or legs? No   3. Do you have chest pain with activity? No   4. Do you have a history of  heart failure? No   5. Do you currently have a cold, bronchitis or symptoms of other infection? No   6. Do you have a cough, shortness of breath, or wheezing? No   7. Do you or anyone in your family have previous history of blood clots? No   8. Do you or does anyone in your family have a serious bleeding problem such as prolonged bleeding following surgeries or cuts? No   9. Have you ever had problems with anemia or been told to take iron pills? No   10. Have you had any abnormal blood loss such as black, tarry or bloody stools, or abnormal vaginal bleeding? No   11. Have you ever had a blood transfusion? No   12. Are you willing to have a blood transfusion if it is medically needed before, during, or after your surgery? NO -    13. Have you or any of your relatives ever had problems with anesthesia? No   14. Do you have sleep apnea, excessive snoring or daytime drowsiness? No   15. Do you have any artifical heart valves or other implanted medical  devices like a pacemaker, defibrillator, or continuous glucose monitor? No   16. Do you have artificial joints? No   17. Are you allergic to latex? No   18. Is there any chance that you may be pregnant? No       Health Care Directive:  Patient does not have a Health Care Directive or Living Will: Advance Directive received and scanned. Click on Code in the patient header to view.    Preoperative Review of :   reviewed - controlled substances prescribed by other outside provider(s).       Status of Chronic Conditions:  See problem list for active medical problems.  Problems all longstanding and stable, except as noted/documented.  See ROS for pertinent symptoms related to these conditions.      Review of Systems  CONSTITUTIONAL: NEGATIVE for fever, chills, change in weight  INTEGUMENTARY/SKIN: NEGATIVE for worrisome rashes, moles or lesions  EYES: NEGATIVE for vision changes or irritation  ENT/MOUTH: NEGATIVE for ear, mouth and throat problems  RESP: NEGATIVE for significant cough or SOB  CV: NEGATIVE for chest pain, palpitations or peripheral edema  GI: NEGATIVE for nausea, abdominal pain, heartburn, or change in bowel habits  : NEGATIVE for frequency, dysuria, or hematuria  MUSCULOSKELETAL: NEGATIVE for significant arthralgias or myalgia  NEURO: NEGATIVE for weakness, dizziness or paresthesias  ENDOCRINE: NEGATIVE for temperature intolerance, skin/hair changes  HEME: NEGATIVE for bleeding problems  PSYCHIATRIC: NEGATIVE for changes in mood or affect    Patient Active Problem List    Diagnosis Date Noted     Upper abdominal pain 12/08/2022     Priority: Medium     Other postablative hypothyroidism 12/08/2022     Priority: Medium     Formatting of this note might be different from the original.  Created by Conversion       Obesity 12/08/2022     Priority: Medium     Formatting of this note might be different from the original.  Created by Conversion       Hypertension 12/08/2022     Priority: Medium      Formatting of this note might be different from the original.  Created by Conversion    Replacement Utility updated for latest IMO load       Dysphagia 2022     Priority: Medium     Morbid obesity (H) 2022     Priority: Medium     Type 2 diabetes mellitus with hyperglycemia (H) 2017     Priority: Medium     CARDIOVASCULAR SCREENING; LDL GOAL LESS THAN 160 2010     Priority: Medium      Past Medical History:   Diagnosis Date     Diabetes mellitus type 2, controlled, with complications (H)      Hypothyroidism      Past Surgical History:   Procedure Laterality Date     TUBAL LIGATION      procedure failed     Current Outpatient Medications   Medication Sig Dispense Refill     insulin glargine (LANTUS SOLOSTAR) 100 UNIT/ML pen Inject 15 Units Subcutaneous       insulin lispro (HUMALOG KWIKPEN) 100 UNIT/ML soln 3 units before breakfast, 3 units before lunch, 3 units before dinner and increase as directed  Max dose 30 units 15 mL 3     Insulin Lispro (HUMALOG KWIKPEN) 200 UNIT/ML soln        insulin pen needle 30G X 8 MM Use 4 pen needles daily or as directed. 100 each 3     losartan (COZAAR) 50 MG tablet Take by mouth every 24 hours       cholecalciferol 50 MCG (2000 UT) tablet Take by mouth every 24 hours (Patient not taking: Reported on 2022)       ondansetron (ZOFRAN) 4 MG tablet Take 1 tablet (4 mg) by mouth every 8 hours as needed for nausea or vomiting (Patient not taking: Reported on 2022) 30 tablet 1       No Known Allergies     Social History     Tobacco Use     Smoking status: Former     Packs/day: 1.00     Years: 22.00     Pack years: 22.00     Types: Cigarettes     Quit date: 2005     Years since quittin.2     Smokeless tobacco: Never   Substance Use Topics     Alcohol use: No     Comment: rarely 1-2 every year     Family History   Problem Relation Age of Onset     Hypertension Mother      Diabetes Type 2  Mother      Breast Cancer Mother      Hypertension  "Father      Cancer Father         lung     Diabetes Type 2  Father      Hypertension Maternal Grandmother      Cancer Maternal Grandmother      Hypertension Maternal Grandfather      Cancer Paternal Grandmother      Cerebrovascular Disease No family hx of      C.A.D. No family hx of      Cancer - colorectal No family hx of      Prostate Cancer No family hx of      History   Drug Use No         Objective     /70   Pulse 100   Temp 97.5  F (36.4  C) (Oral)   Resp 17   Ht 1.574 m (5' 1.95\")   Wt 108.5 kg (239 lb 4.8 oz)   SpO2 100%   BMI 43.84 kg/m      Physical Exam    GENERAL APPEARANCE: healthy, alert and no distress     EYES: EOMI, PERRL     HENT: ear canals and TM's normal and nose and mouth without ulcers or lesions     NECK: no adenopathy, no asymmetry, masses, or scars and thyroid normal to palpation     RESP: lungs clear to auscultation - no rales, rhonchi or wheezes     CV: regular rates and rhythm, normal S1 S2, no S3 or S4 and no murmur, click or rub     ABDOMEN:  soft, nontender, no HSM or masses and bowel sounds normal     MS: extremities normal- no gross deformities noted, no evidence of inflammation in joints, FROM in all extremities.     SKIN: no suspicious lesions or rashes     NEURO: Normal strength and tone, sensory exam grossly normal, mentation intact and speech normal     PSYCH: mentation appears normal. and affect normal/bright     LYMPHATICS: No cervical adenopathy    Recent Labs   Lab Test 01/07/22  1003      POTASSIUM 4.2   CR 0.88   A1C 7.1*        Diagnostics:  Recent Results (from the past 168 hour(s))   CBC with platelets    Collection Time: 12/16/22 11:14 AM   Result Value Ref Range    WBC Count 12.1 (H) 4.0 - 11.0 10e3/uL    RBC Count 4.21 3.80 - 5.20 10e6/uL    Hemoglobin 10.7 (L) 11.7 - 15.7 g/dL    Hematocrit 33.1 (L) 35.0 - 47.0 %    MCV 79 78 - 100 fL    MCH 25.4 (L) 26.5 - 33.0 pg    MCHC 32.3 31.5 - 36.5 g/dL    RDW 13.0 10.0 - 15.0 %    Platelet Count 518 (H) " 150 - 450 10e3/uL   Hemoglobin A1c    Collection Time: 12/16/22 11:14 AM   Result Value Ref Range    Hemoglobin A1C 6.3 (H) 0.0 - 5.6 %   Comprehensive metabolic panel    Collection Time: 12/16/22 11:14 AM   Result Value Ref Range    Sodium 139 136 - 145 mmol/L    Potassium 4.1 3.4 - 5.3 mmol/L    Chloride 106 98 - 107 mmol/L    Carbon Dioxide (CO2) 19 (L) 22 - 29 mmol/L    Anion Gap 14 7 - 15 mmol/L    Urea Nitrogen 26.0 (H) 6.0 - 20.0 mg/dL    Creatinine 1.06 (H) 0.51 - 0.95 mg/dL    Calcium 9.8 8.6 - 10.0 mg/dL    Glucose 117 (H) 70 - 99 mg/dL    Alkaline Phosphatase 70 35 - 104 U/L    AST 16 10 - 35 U/L    ALT 13 10 - 35 U/L    Protein Total 6.5 6.4 - 8.3 g/dL    Albumin 3.8 3.5 - 5.2 g/dL    Bilirubin Total 0.3 <=1.2 mg/dL    GFR Estimate 61 >60 mL/min/1.73m2      No EKG required, no history of coronary heart disease, significant arrhythmia, peripheral arterial disease or other structural heart disease.    Revised Cardiac Risk Index (RCRI):  The patient has the following serious cardiovascular risks for perioperative complications:   - No serious cardiac risks = 0 points     RCRI Interpretation: 0 points: Class I (very low risk - 0.4% complication rate)           Signed Electronically by: Haris Mcclure MD  Copy of this evaluation report is provided to requesting physician.

## 2022-12-16 NOTE — LETTER
December 19, 2022      Mary Anne Oates  [address] [address]        Dear ,    We are writing to inform you of your test results.  Diabetes is very well controlled A1c at 6.3  The white count is slightly elevated, platelets are also slightly high this is likely secondary to your underlying gallbladder disease, additionally you are slightly anemic at 10.7 this does need to be followed up on.  Creatinine the kidney test is just slightly elevated, not sure the significance but he will need follow-up blood work after surgery.    Resulted Orders   CBC with platelets   Result Value Ref Range    WBC Count 12.1 (H) 4.0 - 11.0 10e3/uL    RBC Count 4.21 3.80 - 5.20 10e6/uL    Hemoglobin 10.7 (L) 11.7 - 15.7 g/dL    Hematocrit 33.1 (L) 35.0 - 47.0 %    MCV 79 78 - 100 fL    MCH 25.4 (L) 26.5 - 33.0 pg    MCHC 32.3 31.5 - 36.5 g/dL    RDW 13.0 10.0 - 15.0 %    Platelet Count 518 (H) 150 - 450 10e3/uL   Hemoglobin A1c   Result Value Ref Range    Hemoglobin A1C 6.3 (H) 0.0 - 5.6 %      Comment:      Normal <5.7%   Prediabetes 5.7-6.4%    Diabetes 6.5% or higher     Note: Adopted from ADA consensus guidelines.   Comprehensive metabolic panel   Result Value Ref Range    Sodium 139 136 - 145 mmol/L    Potassium 4.1 3.4 - 5.3 mmol/L    Chloride 106 98 - 107 mmol/L    Carbon Dioxide (CO2) 19 (L) 22 - 29 mmol/L    Anion Gap 14 7 - 15 mmol/L    Urea Nitrogen 26.0 (H) 6.0 - 20.0 mg/dL    Creatinine 1.06 (H) 0.51 - 0.95 mg/dL    Calcium 9.8 8.6 - 10.0 mg/dL    Glucose 117 (H) 70 - 99 mg/dL    Alkaline Phosphatase 70 35 - 104 U/L    AST 16 10 - 35 U/L    ALT 13 10 - 35 U/L    Protein Total 6.5 6.4 - 8.3 g/dL    Albumin 3.8 3.5 - 5.2 g/dL    Bilirubin Total 0.3 <=1.2 mg/dL    GFR Estimate 61 >60 mL/min/1.73m2      Comment:      Effective December 21, 2021 eGFRcr in adults is calculated using the 2021 CKD-EPI creatinine equation which includes age and gender (Padmini et al., NEJM, DOI: 10.1056/QAQAjs1664128)        If you have any questions or concerns, please call the clinic at the number listed above.       Sincerely,      Haris Mcclure MD

## 2022-12-19 RX ORDER — CEFAZOLIN SODIUM/WATER 2 G/20 ML
2 SYRINGE (ML) INTRAVENOUS
Status: COMPLETED | OUTPATIENT
Start: 2022-12-20 | End: 2022-12-20

## 2022-12-20 ENCOUNTER — ANESTHESIA EVENT (OUTPATIENT)
Dept: SURGERY | Facility: CLINIC | Age: 57
End: 2022-12-20
Payer: COMMERCIAL

## 2022-12-20 ENCOUNTER — ANESTHESIA (OUTPATIENT)
Dept: SURGERY | Facility: CLINIC | Age: 57
End: 2022-12-20
Payer: COMMERCIAL

## 2022-12-20 ENCOUNTER — HOSPITAL ENCOUNTER (OUTPATIENT)
Facility: CLINIC | Age: 57
Discharge: HOME | End: 2022-12-20
Attending: SURGERY | Admitting: SURGERY
Payer: COMMERCIAL

## 2022-12-20 VITALS
SYSTOLIC BLOOD PRESSURE: 113 MMHG | HEART RATE: 82 BPM | HEIGHT: 64 IN | OXYGEN SATURATION: 98 % | TEMPERATURE: 97 F | WEIGHT: 233.8 LBS | BODY MASS INDEX: 39.91 KG/M2 | RESPIRATION RATE: 14 BRPM | DIASTOLIC BLOOD PRESSURE: 68 MMHG

## 2022-12-20 DIAGNOSIS — G89.18 POSTOPERATIVE PAIN: Primary | ICD-10-CM

## 2022-12-20 LAB
GLUCOSE BLDC GLUCOMTR-MCNC: 151 MG/DL (ref 70–99)
GLUCOSE BLDC GLUCOMTR-MCNC: 159 MG/DL (ref 70–99)

## 2022-12-20 PROCEDURE — 710N000010 HC RECOVERY PHASE 1, LEVEL 2, PER MIN: Performed by: SURGERY

## 2022-12-20 PROCEDURE — 272N000001 HC OR GENERAL SUPPLY STERILE: Performed by: SURGERY

## 2022-12-20 PROCEDURE — 999N000141 HC STATISTIC PRE-PROCEDURE NURSING ASSESSMENT: Performed by: SURGERY

## 2022-12-20 PROCEDURE — 710N000012 HC RECOVERY PHASE 2, PER MINUTE: Performed by: SURGERY

## 2022-12-20 PROCEDURE — 360N000076 HC SURGERY LEVEL 3, PER MIN: Performed by: SURGERY

## 2022-12-20 PROCEDURE — 258N000001 HC RX 258: Performed by: SURGERY

## 2022-12-20 PROCEDURE — 47562 LAPAROSCOPIC CHOLECYSTECTOMY: CPT | Performed by: SURGERY

## 2022-12-20 PROCEDURE — 250N000009 HC RX 250: Performed by: SURGERY

## 2022-12-20 PROCEDURE — 88304 TISSUE EXAM BY PATHOLOGIST: CPT | Mod: 26 | Performed by: PATHOLOGY

## 2022-12-20 PROCEDURE — 250N000025 HC SEVOFLURANE, PER MIN: Performed by: SURGERY

## 2022-12-20 PROCEDURE — 250N000009 HC RX 250: Performed by: NURSE ANESTHETIST, CERTIFIED REGISTERED

## 2022-12-20 PROCEDURE — 370N000017 HC ANESTHESIA TECHNICAL FEE, PER MIN: Performed by: SURGERY

## 2022-12-20 PROCEDURE — 82962 GLUCOSE BLOOD TEST: CPT

## 2022-12-20 PROCEDURE — 250N000011 HC RX IP 250 OP 636: Performed by: NURSE ANESTHETIST, CERTIFIED REGISTERED

## 2022-12-20 PROCEDURE — 88304 TISSUE EXAM BY PATHOLOGIST: CPT | Mod: TC | Performed by: SURGERY

## 2022-12-20 PROCEDURE — 258N000003 HC RX IP 258 OP 636: Performed by: ANESTHESIOLOGY

## 2022-12-20 PROCEDURE — 250N000011 HC RX IP 250 OP 636: Performed by: SURGERY

## 2022-12-20 RX ORDER — LEVOTHYROXINE SODIUM 200 UG/1
200 TABLET ORAL DAILY
COMMUNITY

## 2022-12-20 RX ORDER — SODIUM CHLORIDE, SODIUM LACTATE, POTASSIUM CHLORIDE, CALCIUM CHLORIDE 600; 310; 30; 20 MG/100ML; MG/100ML; MG/100ML; MG/100ML
INJECTION, SOLUTION INTRAVENOUS CONTINUOUS
Status: DISCONTINUED | OUTPATIENT
Start: 2022-12-20 | End: 2022-12-20 | Stop reason: HOSPADM

## 2022-12-20 RX ORDER — HYDROMORPHONE HCL IN WATER/PF 6 MG/30 ML
0.4 PATIENT CONTROLLED ANALGESIA SYRINGE INTRAVENOUS EVERY 5 MIN PRN
Status: DISCONTINUED | OUTPATIENT
Start: 2022-12-20 | End: 2022-12-20 | Stop reason: HOSPADM

## 2022-12-20 RX ORDER — KETAMINE HYDROCHLORIDE 10 MG/ML
INJECTION INTRAMUSCULAR; INTRAVENOUS PRN
Status: DISCONTINUED | OUTPATIENT
Start: 2022-12-20 | End: 2022-12-20

## 2022-12-20 RX ORDER — ONDANSETRON 2 MG/ML
4 INJECTION INTRAMUSCULAR; INTRAVENOUS EVERY 30 MIN PRN
Status: DISCONTINUED | OUTPATIENT
Start: 2022-12-20 | End: 2022-12-20 | Stop reason: HOSPADM

## 2022-12-20 RX ORDER — HYDROMORPHONE HCL IN WATER/PF 6 MG/30 ML
0.2 PATIENT CONTROLLED ANALGESIA SYRINGE INTRAVENOUS EVERY 5 MIN PRN
Status: DISCONTINUED | OUTPATIENT
Start: 2022-12-20 | End: 2022-12-20 | Stop reason: HOSPADM

## 2022-12-20 RX ORDER — DEXAMETHASONE SODIUM PHOSPHATE 10 MG/ML
INJECTION, SOLUTION INTRAMUSCULAR; INTRAVENOUS PRN
Status: DISCONTINUED | OUTPATIENT
Start: 2022-12-20 | End: 2022-12-20

## 2022-12-20 RX ORDER — ONDANSETRON 4 MG/1
4 TABLET, ORALLY DISINTEGRATING ORAL EVERY 30 MIN PRN
Status: DISCONTINUED | OUTPATIENT
Start: 2022-12-20 | End: 2022-12-20 | Stop reason: HOSPADM

## 2022-12-20 RX ORDER — KETOROLAC TROMETHAMINE 30 MG/ML
INJECTION, SOLUTION INTRAMUSCULAR; INTRAVENOUS PRN
Status: DISCONTINUED | OUTPATIENT
Start: 2022-12-20 | End: 2022-12-20

## 2022-12-20 RX ORDER — MEPERIDINE HYDROCHLORIDE 25 MG/ML
12.5 INJECTION INTRAMUSCULAR; INTRAVENOUS; SUBCUTANEOUS
Status: DISCONTINUED | OUTPATIENT
Start: 2022-12-20 | End: 2022-12-20 | Stop reason: HOSPADM

## 2022-12-20 RX ORDER — FENTANYL CITRATE 50 UG/ML
25 INJECTION, SOLUTION INTRAMUSCULAR; INTRAVENOUS
Status: DISCONTINUED | OUTPATIENT
Start: 2022-12-20 | End: 2022-12-20 | Stop reason: HOSPADM

## 2022-12-20 RX ORDER — FENTANYL CITRATE 50 UG/ML
25 INJECTION, SOLUTION INTRAMUSCULAR; INTRAVENOUS EVERY 5 MIN PRN
Status: DISCONTINUED | OUTPATIENT
Start: 2022-12-20 | End: 2022-12-20 | Stop reason: HOSPADM

## 2022-12-20 RX ORDER — PROPOFOL 10 MG/ML
INJECTION, EMULSION INTRAVENOUS PRN
Status: DISCONTINUED | OUTPATIENT
Start: 2022-12-20 | End: 2022-12-20

## 2022-12-20 RX ORDER — HYDROCODONE BITARTRATE AND ACETAMINOPHEN 5; 325 MG/1; MG/1
1-2 TABLET ORAL EVERY 6 HOURS PRN
Qty: 20 TABLET | Refills: 0 | Status: SHIPPED | OUTPATIENT
Start: 2022-12-20

## 2022-12-20 RX ORDER — BUPIVACAINE HYDROCHLORIDE AND EPINEPHRINE 5; 5 MG/ML; UG/ML
INJECTION, SOLUTION PERINEURAL PRN
Status: DISCONTINUED | OUTPATIENT
Start: 2022-12-20 | End: 2022-12-20 | Stop reason: HOSPADM

## 2022-12-20 RX ORDER — LIDOCAINE HYDROCHLORIDE 10 MG/ML
INJECTION, SOLUTION INFILTRATION; PERINEURAL PRN
Status: DISCONTINUED | OUTPATIENT
Start: 2022-12-20 | End: 2022-12-20

## 2022-12-20 RX ORDER — FENTANYL CITRATE 50 UG/ML
50 INJECTION, SOLUTION INTRAMUSCULAR; INTRAVENOUS EVERY 5 MIN PRN
Status: DISCONTINUED | OUTPATIENT
Start: 2022-12-20 | End: 2022-12-20 | Stop reason: HOSPADM

## 2022-12-20 RX ORDER — ONDANSETRON 2 MG/ML
INJECTION INTRAMUSCULAR; INTRAVENOUS PRN
Status: DISCONTINUED | OUTPATIENT
Start: 2022-12-20 | End: 2022-12-20

## 2022-12-20 RX ORDER — BUPIVACAINE HYDROCHLORIDE AND EPINEPHRINE 5; 5 MG/ML; UG/ML
INJECTION, SOLUTION EPIDURAL; INTRACAUDAL; PERINEURAL
Status: DISCONTINUED
Start: 2022-12-20 | End: 2022-12-20 | Stop reason: HOSPADM

## 2022-12-20 RX ORDER — FENTANYL CITRATE 50 UG/ML
INJECTION, SOLUTION INTRAMUSCULAR; INTRAVENOUS PRN
Status: DISCONTINUED | OUTPATIENT
Start: 2022-12-20 | End: 2022-12-20

## 2022-12-20 RX ORDER — LIDOCAINE 40 MG/G
CREAM TOPICAL
Status: DISCONTINUED | OUTPATIENT
Start: 2022-12-20 | End: 2022-12-20 | Stop reason: HOSPADM

## 2022-12-20 RX ADMIN — Medication 2 G: at 10:02

## 2022-12-20 RX ADMIN — FENTANYL CITRATE 100 MCG: 50 INJECTION, SOLUTION INTRAMUSCULAR; INTRAVENOUS at 10:07

## 2022-12-20 RX ADMIN — MIDAZOLAM 2 MG: 1 INJECTION INTRAMUSCULAR; INTRAVENOUS at 10:00

## 2022-12-20 RX ADMIN — SUGAMMADEX 200 MG: 100 INJECTION, SOLUTION INTRAVENOUS at 10:57

## 2022-12-20 RX ADMIN — KETOROLAC TROMETHAMINE 15 MG: 30 INJECTION, SOLUTION INTRAMUSCULAR at 10:55

## 2022-12-20 RX ADMIN — SODIUM CHLORIDE, POTASSIUM CHLORIDE, SODIUM LACTATE AND CALCIUM CHLORIDE: 600; 310; 30; 20 INJECTION, SOLUTION INTRAVENOUS at 09:36

## 2022-12-20 RX ADMIN — DEXAMETHASONE SODIUM PHOSPHATE 10 MG: 10 INJECTION, SOLUTION INTRAMUSCULAR; INTRAVENOUS at 10:23

## 2022-12-20 RX ADMIN — HYDROMORPHONE HYDROCHLORIDE 1 MG: 1 INJECTION, SOLUTION INTRAMUSCULAR; INTRAVENOUS; SUBCUTANEOUS at 10:47

## 2022-12-20 RX ADMIN — LIDOCAINE HYDROCHLORIDE 2 ML: 10 INJECTION, SOLUTION INFILTRATION; PERINEURAL at 10:07

## 2022-12-20 RX ADMIN — KETAMINE HYDROCHLORIDE 50 MG: 10 INJECTION, SOLUTION INTRAMUSCULAR; INTRAVENOUS at 10:30

## 2022-12-20 RX ADMIN — PROPOFOL 200 MG: 10 INJECTION, EMULSION INTRAVENOUS at 10:07

## 2022-12-20 RX ADMIN — ROCURONIUM BROMIDE 50 MG: 10 INJECTION, SOLUTION INTRAVENOUS at 10:07

## 2022-12-20 RX ADMIN — ONDANSETRON 4 MG: 2 INJECTION INTRAMUSCULAR; INTRAVENOUS at 10:23

## 2022-12-20 ASSESSMENT — ACTIVITIES OF DAILY LIVING (ADL)
ADLS_ACUITY_SCORE: 35

## 2022-12-20 NOTE — PHARMACY-ADMISSION MEDICATION HISTORY
Pharmacy Note - Admission Medication History    Pertinent Provider Information: n/a   ______________________________________________________________________    Prior To Admission (PTA) med list completed and updated in EMR.       PTA Med List   Medication Sig Note Last Dose     insulin glargine (LANTUS SOLOSTAR) 100 UNIT/ML pen Inject 15 Units Subcutaneous At Bedtime 12/20/2022: Took 8 units last night 12/19/22 12/19/2022     insulin lispro (HUMALOG KWIKPEN) 100 UNIT/ML soln 3 units before breakfast, 3 units before lunch, 3 units before dinner and increase as directed  Max dose 30 units  12/18/2022     levothyroxine (SYNTHROID/LEVOTHROID) 200 MCG tablet Take 200 mcg by mouth daily  12/19/2022     losartan (COZAAR) 50 MG tablet Take 50 mg by mouth At Bedtime  12/18/2022       Information source(s): [facility]    Method of interview communication: in-person    Patient was asked about OTC/herbal products specifically.  PTA med list reflects this.    Based on the pharmacist's assessment, the PTA med list information appears reliable    Allergies were reviewed, assessed, and updated with the patient.      Patient does not use any multi-dose medications prior to admission.     Thank you for the opportunity to participate in the care of this patient.      Aranza Tee RPH     12/20/2022     9:50 AM

## 2022-12-20 NOTE — DISCHARGE INSTRUCTIONS
You received Toradol, an IV form of Ibuprofen (Motrin) at 11 AM.  Do not take any Ibuprofen products until 5 PM.      Discharge Instructions: After Your Surgery  You ve just had surgery. During surgery, you were given medicine called anesthesia to keep you relaxed and free of pain. After surgery, you may have some pain or nausea. This is common. Here are some tips for feeling better and getting well after surgery.  Going home  Your healthcare provider will show you how to take care of yourself when you go home. He or she will also answer your questions. Have an adult family member or friend drive you home. For the first 24 hours after your surgery:  Don't drive or use heavy equipment.  Don't make important decisions or sign legal papers.  Don't drink alcohol.  Have someone stay with you. He or she can watch for problems and help keep you safe.  Be sure to go to all follow-up visits with your healthcare provider. And rest after your surgery for as long as your healthcare provider tells you to.  Coping with pain  If you have pain after surgery, pain medicine will help you feel better. Take it as told, before pain becomes severe. Also, ask your healthcare provider or pharmacist about other ways to control pain. This might be with heat, ice, or relaxation. And follow any other instructions your surgeon or nurse gives you.  Tips for taking pain medicine  To get the best relief possible, remember these points:  Pain medicines can upset your stomach. Taking them with a little food may help.  Most pain relievers taken by mouth need at least 20 to 30 minutes to start to work.  Don't wait till your pain becomes severe before you take your medicine. Try to time your medicine so that you can take it before starting an activity. This might be before you get dressed, go for a walk, or sit down for dinner.  Constipation is a common side effect of pain medicines. Call your healthcare provider before taking any medicines such as  laxatives or stool softeners to help ease constipation. Also ask if you should skip any foods. Drinking lots of fluids and eating foods such as fruits and vegetables that are high in fiber can also help. Remember, don't take laxatives unless your surgeon has prescribed them.  Drinking alcohol and taking pain medicine can cause dizziness and slow your breathing. It can even be deadly. Don't drink alcohol while taking pain medicine.  Pain medicine can make you react more slowly to things. Don't drive or run machinery while taking pain medicine.  Your healthcare provider may tell you to take acetaminophen to help ease your pain. Ask him or her how much you are supposed to take each day. Acetaminophen or other pain relievers may interact with your prescription medicines or other over-the-counter (OTC) medicines. Some prescription medicines have acetaminophen and other ingredients. Using both prescription and OTC acetaminophen for pain can cause you to overdose. Read the labels on your OTC medicines with care. This will help you to clearly know the list of ingredients, how much to take, and any warnings. It may also help you not take too much acetaminophen. If you have questions or don't understand the information, ask your pharmacist or healthcare provider to explain it to you before you take the OTC medicine.  Managing nausea  Some people have an upset stomach after surgery. This is often because of anesthesia, pain, or pain medicine, or the stress of surgery. These tips will help you handle nausea and eat healthy foods as you get better. If you were on a special food plan before surgery, ask your healthcare provider if you should follow it while you get better. These tips may help:  Don't push yourself to eat. Your body will tell you when to eat and how much.  Start off with clear liquids and soup. They are easier to digest.  Next try semi-solid foods, such as mashed potatoes, applesauce, and gelatin, as you feel  ready.  Slowly move to solid foods. Don t eat fatty, rich, or spicy foods at first.  Don't force yourself to have 3 large meals a day. Instead eat smaller amounts more often.  Take pain medicines with a small amount of solid food, such as crackers or toast, to prevent nausea.  When to call your healthcare provider  Call your healthcare provider if:  You still have intolerable pain an hour after taking medicine. The medicine may not be strong enough.  You feel too sleepy, dizzy, or groggy. The medicine may be too strong.  You have side effects such as nausea or vomiting, or skin changes such as rash, itching, or hives. Your healthcare provider may suggest other medicines to control side effects.  Rash, itching, or hives may mean you have an allergic reaction. Report this right away. If you have trouble breathing or facial swelling, call 911 right away.  If you have obstructive sleep apnea  You were given anesthesia medicine during surgery to keep you comfortable and free of pain. After surgery, you may have more apnea spells because of this medicine and other medicines you were given. The spells may last longer than usual.   At home:  Keep using the continuous positive airway pressure (CPAP) device when you sleep. Unless your healthcare provider tells you not to, use it when you sleep, day or night. CPAP is a common device used to treat obstructive sleep apnea.  Talk with your provider before taking any pain medicine, muscle relaxants, or sedatives. Your provider will tell you about the possible dangers of taking these medicines.  [employer] last reviewed this educational content on 3/1/2019    3791-2869 [employer]. All rights reserved. This information is not intended as a substitute for professional medical care. Always follow your healthcare professional's instructions.

## 2022-12-20 NOTE — INTERVAL H&P NOTE
Patient seen in preop with her daughters.  Continues to have pretty frequent gallstone attacks and symptoms, especially at night.  She otherwise denies new medical history since she was last seen.  All questions answered regarding procedure.  Consent obtained.  To the OR for laparoscopic cholecystectomy.    Betty Scott DO  General Surgeon  [facility]  Surgery Clinic - [address] [address]?  Office: [phone]  Employed by - [facility]

## 2022-12-20 NOTE — ANESTHESIA POSTPROCEDURE EVALUATION
Patient: Mary Anne A Oates    Procedure: Procedure(s):  CHOLECYSTECTOMY, LAPAROSCOPIC       Anesthesia Type:  General    Note:  Disposition: Admission   Postop Pain Control: Uneventful            Sign Out: Well controlled pain   PONV: No   Neuro/Psych: Uneventful            Sign Out: Acceptable/Baseline neuro status   Airway/Respiratory: Uneventful            Sign Out: Acceptable/Baseline resp. status   CV/Hemodynamics: Uneventful            Sign Out: Acceptable CV status; No obvious hypovolemia; No obvious fluid overload   Other NRE: NONE   DID A NON-ROUTINE EVENT OCCUR? No           Last vitals:  Vitals Value Taken Time   /76 12/20/22 1200   Temp 36.1  C (97  F) 12/20/22 1200   Pulse 78 12/20/22 1205   Resp 10 12/20/22 1205   SpO2 97 % 12/20/22 1205   Vitals shown include unvalidated device data.    Electronically Signed By: Horacio Burt MD  December 20, 2022  4:07 PM

## 2022-12-20 NOTE — ANESTHESIA PREPROCEDURE EVALUATION
Anesthesia Pre-Procedure Evaluation    Patient: Mary Anne Oates   MRN: [id number] : 1965        Procedure : Procedure(s):  CHOLECYSTECTOMY, LAPAROSCOPIC          Past Medical History:   Diagnosis Date     Diabetes (H)      Thyroid disease       Past Surgical History:   Procedure Laterality Date     TUBAL LIGATION      procedure failed      No Known Allergies   Social History     Tobacco Use     Smoking status: Former     Packs/day: 1.00     Years: 22.00     Pack years: 22.00     Types: Cigarettes     Quit date: 2005     Years since quittin.2     Smokeless tobacco: Never   Substance Use Topics     Alcohol use: No     Comment: rarely 1-2 every year      Wt Readings from Last 1 Encounters:   22 106.1 kg (233 lb 12.8 oz)        Anesthesia Evaluation   Pt has had prior anesthetic.         ROS/MED HX  ENT/Pulmonary:  - neg pulmonary ROS     Neurologic:  - neg neurologic ROS     Cardiovascular:     (+) hypertension-----    METS/Exercise Tolerance:     Hematologic:  - neg hematologic  ROS     Musculoskeletal:  - neg musculoskeletal ROS     GI/Hepatic:  - neg GI/hepatic ROS     Renal/Genitourinary:  - neg Renal ROS     Endo:     (+) type I DM, thyroid problem, Obesity,     Psychiatric/Substance Use:  - neg psychiatric ROS     Infectious Disease:  - neg infectious disease ROS     Malignancy:  - neg malignancy ROS     Other:  - neg other ROS          Physical Exam    Airway  airway exam normal           Respiratory Devices and Support         Dental  no notable dental history         Cardiovascular   cardiovascular exam normal          Pulmonary   pulmonary exam normal                OUTSIDE LABS:  CBC:   Lab Results   Component Value Date    WBC 12.1 (H) 2022    HGB 10.7 (L) 2022    HCT 33.1 (L) 2022     (H) 2022     BMP:   Lab Results   Component Value Date     2022     2022    POTASSIUM 4.1 2022    POTASSIUM 4.2 2022    CHLORIDE  106 12/16/2022    CHLORIDE 110 (H) 01/07/2022    CO2 19 (L) 12/16/2022    CO2 27 01/07/2022    BUN 26.0 (H) 12/16/2022    BUN 25 01/07/2022    CR 1.06 (H) 12/16/2022    CR 0.88 01/07/2022     (H) 12/16/2022     (H) 01/07/2022     COAGS: No results found for: PTT, INR, FIBR  POC: No results found for: BGM, HCG, HCGS  HEPATIC:   Lab Results   Component Value Date    ALBUMIN 3.8 12/16/2022    PROTTOTAL 6.5 12/16/2022    ALT 13 12/16/2022    AST 16 12/16/2022    ALKPHOS 70 12/16/2022    BILITOTAL 0.3 12/16/2022     OTHER:   Lab Results   Component Value Date    A1C 6.3 (H) 12/16/2022    DHRUV 9.8 12/16/2022    PHOS 3.2 01/07/2022    TSH 0.29 (L) 01/07/2022    T4 1.34 01/07/2022       Anesthesia Plan    ASA Status:  3      Anesthesia Type: General.     - Airway: ETT              Consents    Anesthesia Plan(s) and associated risks, benefits, and realistic alternatives discussed. Questions answered and patient/representative(s) expressed understanding.    - Discussed:     - Discussed with:  Patient         Postoperative Care            Comments:                Horacio Burt MD

## 2022-12-20 NOTE — OP NOTE
Name:  Mary Anne Oates  PCP:  RichardAnthony  Procedure Date:  12/20/2022      LAPAROSCOPIC CHOLECYSTECTOMY      Pre-Procedure Diagnosis:  Biliary colic     Post-Procedure Diagnosis:    Biliary colic  Chronic calculus cholecystitis    Surgeon:  Betty Scott DO    Assist:  None    Anesthesia Type:    GET    Estimated Blood Loss:   3 cc    Specimens:    Gallbladder       Drains:   None    Complications:    None apparent    Indication for procedure:  This is a 57-year-old female who has been having recurring upper abdominal symptoms.  She was found to have gallstones on ultrasound.  It was thought that her symptoms were due to biliary colic and she has elected for operative intervention for treatment.    Operative Report:    After informed consent was obtained, and the risks and benefits of the procedure were discussed, the patient was brought back to the operative suite and placed in the supine position.  General endotracheal anesthesia was administered and tolerated well.  A Time Out was held, and the abdomen was prepped and draped in the usual sterile fashion.  Local anesthetic agent was injected into the skin superior and lateral to the umbilicus and an incision made.  A 5mm optical trocar was placed under direct visualization.  Pneumoperitoneum was established to a pressure of 15 mm Hg.  After local infiltration, three more ports were then placed under direct vision in the epigastrium, right subcostal midclavicular line, and right subcostal mid-axillary line.  The gallbladder was identified, the fundus grasped and retracted cephalad. The gallbladder appeared mildly distended.  The gallbladder wall itself was very thin and friable consistent with chronic cholecystitis.  The infundibulum was grasped and retracted laterally, exposing the peritoneum overlying the triangle of Calot. This was dissected bluntly and with hook electrocautery until the cystic duct was clearly identified and freed circumferentially. The  same occurred with the cystic artery so that a critical view was obtained.  The junction of the gallbladder and cystic duct was clearly identified and clipped proximally with 3 large clips and on the gallbladder side with 1 clip.  The cystic duct was divided. The cystic artery was similarly clipped and cut.  The gallbladder was dissected from the liver bed in retrograde fashion with the electrocautery. The gallbladder was removed with the assistance of an endocatch bag and extracted from the 11 mm trocar site.  Hemostasis was assured.  The fascia of the 11 mm trocar site was then closed with 0 Vicryl.  Pneumoperitoneum was reduced.  The trocars were removed. The skin was then closed with 4-0 Vicryl, followed by a sterile dressing.    Instrument, sponge, and needle counts were correct at closure and at the conclusion of the case.     Disposition:  The patient tolerated the procedure well.  They were transferred to the postanesthesia care unit in stable condition.  Her cystic duct was a little on the large side.  The large clips did accommodate it easily.  If she were to continue to have symptoms of biliary colic, then she would need to be worked up for choledocholithiasis with GI.    Betty Scott DO  General Surgeon  [facility]  Surgery [address] [address]  Office: [phone]  Employed by - [facility]

## 2022-12-20 NOTE — ANESTHESIA CARE TRANSFER NOTE
Patient: Mary Anne Oates    Procedure: Procedure(s):  CHOLECYSTECTOMY, LAPAROSCOPIC       Diagnosis: Biliary colic [K80.50]  Diagnosis Additional Information: No value filed.    Anesthesia Type:   General     Note:    Oropharynx: oropharynx clear of all foreign objects  Level of Consciousness: drowsy  Oxygen Supplementation: face mask  Level of Supplemental Oxygen (L/min / FiO2): 6  Independent Airway: airway patency satisfactory and stable  Dentition: dentition unchanged  Vital Signs Stable: post-procedure vital signs reviewed and stable  Report to RN Given: handoff report given  Patient transferred to: PACU    Handoff Report: Identifed the Patient, Identified the Reponsible Provider, Reviewed the pertinent medical history, Discussed the surgical course, Reviewed Intra-OP anesthesia mangement and issues during anesthesia, Set expectations for post-procedure period and Allowed opportunity for questions and acknowledgement of understanding      Vitals:  Vitals Value Taken Time   /69 12/20/22 1120   Temp 36.7  C (98  F) 12/20/22 1109   Pulse 85 12/20/22 1123   Resp 19 12/20/22 1123   SpO2 100 % 12/20/22 1123   Vitals shown include unvalidated device data.    Electronically Signed By: LIVIER LAZARO CRNA  December 20, 2022  11:25 AM

## 2022-12-21 LAB
PATH REPORT.COMMENTS IMP SPEC: NORMAL
PATH REPORT.COMMENTS IMP SPEC: NORMAL
PATH REPORT.FINAL DX SPEC: NORMAL
PATH REPORT.GROSS SPEC: NORMAL
PATH REPORT.MICROSCOPIC SPEC OTHER STN: NORMAL
PATH REPORT.RELEVANT HX SPEC: NORMAL
PHOTO IMAGE: NORMAL

## 2023-04-15 ENCOUNTER — HEALTH MAINTENANCE LETTER (OUTPATIENT)
Age: 58
End: 2023-04-15

## 2023-07-08 ENCOUNTER — HEALTH MAINTENANCE LETTER (OUTPATIENT)
Age: 58
End: 2023-07-08

## 2024-02-03 ENCOUNTER — HEALTH MAINTENANCE LETTER (OUTPATIENT)
Age: 59
End: 2024-02-03

## 2024-06-16 ENCOUNTER — HEALTH MAINTENANCE LETTER (OUTPATIENT)
Age: 59
End: 2024-06-16

## 2024-08-25 ENCOUNTER — HEALTH MAINTENANCE LETTER (OUTPATIENT)
Age: 59
End: 2024-08-25

## 2024-12-16 ENCOUNTER — ENROLLMENT (OUTPATIENT)
Dept: HOME HEALTH SERVICES | Facility: HOME HEALTH | Age: 59
End: 2024-12-16
Payer: COMMERCIAL

## 2024-12-17 ENCOUNTER — MEDICAL CORRESPONDENCE (OUTPATIENT)
Dept: HOME HEALTH SERVICES | Facility: HOME HEALTH | Age: 59
End: 2024-12-17
Payer: COMMERCIAL

## 2024-12-18 ENCOUNTER — HOME INFUSION BILLING (OUTPATIENT)
Dept: HOME HEALTH SERVICES | Facility: HOME HEALTH | Age: 59
End: 2024-12-18
Payer: COMMERCIAL

## 2024-12-27 ENCOUNTER — HOME INFUSION BILLING (OUTPATIENT)
Dept: HOME HEALTH SERVICES | Facility: HOME HEALTH | Age: 59
End: 2024-12-27
Payer: COMMERCIAL

## 2024-12-30 PROCEDURE — S9330 HIT CONT CHEM DIEM: HCPCS

## 2024-12-31 PROCEDURE — S9330 HIT CONT CHEM DIEM: HCPCS

## 2025-01-17 ENCOUNTER — HOME INFUSION BILLING (OUTPATIENT)
Dept: HOME HEALTH SERVICES | Facility: HOME HEALTH | Age: 60
End: 2025-01-17
Payer: COMMERCIAL

## 2025-01-29 ENCOUNTER — HOME INFUSION (OUTPATIENT)
Dept: HOME HEALTH SERVICES | Facility: HOME HEALTH | Age: 60
End: 2025-01-29
Payer: COMMERCIAL

## 2025-01-31 ENCOUNTER — HOME INFUSION BILLING (OUTPATIENT)
Dept: HOME HEALTH SERVICES | Facility: HOME HEALTH | Age: 60
End: 2025-01-31
Payer: COMMERCIAL

## 2025-02-05 PROCEDURE — S9330 HIT CONT CHEM DIEM: HCPCS

## 2025-02-06 PROCEDURE — S9330 HIT CONT CHEM DIEM: HCPCS

## 2025-02-07 PROCEDURE — S9330 HIT CONT CHEM DIEM: HCPCS

## 2025-02-08 PROCEDURE — S9330 HIT CONT CHEM DIEM: HCPCS

## 2025-02-09 PROCEDURE — S9330 HIT CONT CHEM DIEM: HCPCS

## 2025-02-10 PROCEDURE — S9330 HIT CONT CHEM DIEM: HCPCS

## 2025-02-11 ENCOUNTER — HOME INFUSION (OUTPATIENT)
Dept: HOME HEALTH SERVICES | Facility: HOME HEALTH | Age: 60
End: 2025-02-11
Payer: COMMERCIAL

## 2025-02-11 PROCEDURE — S9330 HIT CONT CHEM DIEM: HCPCS

## 2025-02-12 PROCEDURE — S9330 HIT CONT CHEM DIEM: HCPCS

## 2025-02-13 PROCEDURE — S9330 HIT CONT CHEM DIEM: HCPCS

## 2025-02-14 ENCOUNTER — HOME INFUSION BILLING (OUTPATIENT)
Dept: HOME HEALTH SERVICES | Facility: HOME HEALTH | Age: 60
End: 2025-02-14
Payer: COMMERCIAL

## 2025-02-14 PROCEDURE — S9330 HIT CONT CHEM DIEM: HCPCS

## 2025-02-15 PROCEDURE — S9330 HIT CONT CHEM DIEM: HCPCS

## 2025-02-16 PROCEDURE — S9330 HIT CONT CHEM DIEM: HCPCS

## 2025-02-17 PROCEDURE — S9330 HIT CONT CHEM DIEM: HCPCS

## 2025-02-18 PROCEDURE — S9330 HIT CONT CHEM DIEM: HCPCS

## 2025-02-19 PROCEDURE — S9330 HIT CONT CHEM DIEM: HCPCS

## 2025-02-20 PROCEDURE — S9330 HIT CONT CHEM DIEM: HCPCS

## 2025-02-21 PROCEDURE — S9330 HIT CONT CHEM DIEM: HCPCS

## 2025-03-02 ENCOUNTER — HEALTH MAINTENANCE LETTER (OUTPATIENT)
Age: 60
End: 2025-03-02

## 2025-03-04 ENCOUNTER — HOME INFUSION (OUTPATIENT)
Dept: HOME HEALTH SERVICES | Facility: HOME HEALTH | Age: 60
End: 2025-03-04
Payer: COMMERCIAL

## 2025-03-04 DIAGNOSIS — C78.89: ICD-10-CM

## 2025-03-07 ENCOUNTER — HOME INFUSION BILLING (OUTPATIENT)
Dept: HOME HEALTH SERVICES | Facility: HOME HEALTH | Age: 60
End: 2025-03-07
Payer: COMMERCIAL

## 2025-03-10 PROCEDURE — S9330 HIT CONT CHEM DIEM: HCPCS

## 2025-03-11 PROCEDURE — S9330 HIT CONT CHEM DIEM: HCPCS

## 2025-03-12 PROCEDURE — S9330 HIT CONT CHEM DIEM: HCPCS

## 2025-03-21 ENCOUNTER — HOME INFUSION BILLING (OUTPATIENT)
Dept: HOME HEALTH SERVICES | Facility: HOME HEALTH | Age: 60
End: 2025-03-21
Payer: COMMERCIAL

## 2025-03-26 PROCEDURE — S9330 HIT CONT CHEM DIEM: HCPCS

## 2025-03-27 PROCEDURE — S9330 HIT CONT CHEM DIEM: HCPCS

## 2025-04-01 ENCOUNTER — HOME INFUSION (OUTPATIENT)
Dept: HOME HEALTH SERVICES | Facility: HOME HEALTH | Age: 60
End: 2025-04-01
Payer: COMMERCIAL

## 2025-04-01 DIAGNOSIS — C78.89: ICD-10-CM

## 2025-04-04 ENCOUNTER — HOME INFUSION BILLING (OUTPATIENT)
Dept: HOME HEALTH SERVICES | Facility: HOME HEALTH | Age: 60
End: 2025-04-04
Payer: COMMERCIAL

## 2025-04-09 PROCEDURE — S9330 HIT CONT CHEM DIEM: HCPCS

## 2025-04-10 PROCEDURE — S9330 HIT CONT CHEM DIEM: HCPCS

## 2025-04-15 ENCOUNTER — HOME INFUSION (OUTPATIENT)
Dept: HOME HEALTH SERVICES | Facility: HOME HEALTH | Age: 60
End: 2025-04-15
Payer: COMMERCIAL

## 2025-04-15 DIAGNOSIS — C78.89: ICD-10-CM

## 2025-04-18 ENCOUNTER — HOME INFUSION BILLING (OUTPATIENT)
Dept: HOME HEALTH SERVICES | Facility: HOME HEALTH | Age: 60
End: 2025-04-18
Payer: COMMERCIAL

## 2025-04-19 ENCOUNTER — HEALTH MAINTENANCE LETTER (OUTPATIENT)
Age: 60
End: 2025-04-19

## 2025-04-23 PROCEDURE — S9330 HIT CONT CHEM DIEM: HCPCS

## 2025-04-24 PROCEDURE — S9330 HIT CONT CHEM DIEM: HCPCS

## 2025-05-02 ENCOUNTER — HOME INFUSION BILLING (OUTPATIENT)
Dept: HOME HEALTH SERVICES | Facility: HOME HEALTH | Age: 60
End: 2025-05-02
Payer: COMMERCIAL

## 2025-05-07 PROCEDURE — S9330 HIT CONT CHEM DIEM: HCPCS

## 2025-05-08 PROCEDURE — S9330 HIT CONT CHEM DIEM: HCPCS

## 2025-05-13 ENCOUNTER — MEDICAL CORRESPONDENCE (OUTPATIENT)
Dept: HOME HEALTH SERVICES | Facility: HOME HEALTH | Age: 60
End: 2025-05-13
Payer: COMMERCIAL

## 2025-05-13 ENCOUNTER — HOME INFUSION (OUTPATIENT)
Dept: HOME HEALTH SERVICES | Facility: HOME HEALTH | Age: 60
End: 2025-05-13
Payer: COMMERCIAL

## 2025-05-13 DIAGNOSIS — C78.89: ICD-10-CM

## 2025-05-16 ENCOUNTER — HOME INFUSION BILLING (OUTPATIENT)
Dept: HOME HEALTH SERVICES | Facility: HOME HEALTH | Age: 60
End: 2025-05-16
Payer: COMMERCIAL

## 2025-05-21 PROCEDURE — S9330 HIT CONT CHEM DIEM: HCPCS

## 2025-05-22 PROCEDURE — S9330 HIT CONT CHEM DIEM: HCPCS

## 2025-05-27 ENCOUNTER — MEDICAL CORRESPONDENCE (OUTPATIENT)
Dept: HOME HEALTH SERVICES | Facility: HOME HEALTH | Age: 60
End: 2025-05-27
Payer: COMMERCIAL

## 2025-05-30 ENCOUNTER — HOME INFUSION BILLING (OUTPATIENT)
Dept: HOME HEALTH SERVICES | Facility: HOME HEALTH | Age: 60
End: 2025-05-30
Payer: COMMERCIAL

## 2025-06-04 PROCEDURE — S9330 HIT CONT CHEM DIEM: HCPCS

## 2025-06-05 PROCEDURE — S9330 HIT CONT CHEM DIEM: HCPCS

## 2025-06-10 ENCOUNTER — HOME INFUSION (OUTPATIENT)
Dept: HOME HEALTH SERVICES | Facility: HOME HEALTH | Age: 60
End: 2025-06-10
Payer: COMMERCIAL

## 2025-06-10 DIAGNOSIS — C78.89: ICD-10-CM

## 2025-06-13 ENCOUNTER — HOME INFUSION BILLING (OUTPATIENT)
Dept: HOME HEALTH SERVICES | Facility: HOME HEALTH | Age: 60
End: 2025-06-13
Payer: COMMERCIAL

## 2025-06-18 ENCOUNTER — MEDICAL CORRESPONDENCE (OUTPATIENT)
Dept: HOME HEALTH SERVICES | Facility: HOME HEALTH | Age: 60
End: 2025-06-18
Payer: COMMERCIAL

## 2025-06-18 PROCEDURE — S9330 HIT CONT CHEM DIEM: HCPCS

## 2025-06-19 PROCEDURE — S9330 HIT CONT CHEM DIEM: HCPCS

## 2025-06-21 ENCOUNTER — HEALTH MAINTENANCE LETTER (OUTPATIENT)
Age: 60
End: 2025-06-21

## 2025-06-24 ENCOUNTER — HOME INFUSION (OUTPATIENT)
Dept: HOME HEALTH SERVICES | Facility: HOME HEALTH | Age: 60
End: 2025-06-24
Payer: COMMERCIAL

## 2025-06-24 DIAGNOSIS — C78.89: ICD-10-CM

## 2025-06-27 ENCOUNTER — HOME INFUSION BILLING (OUTPATIENT)
Dept: HOME HEALTH SERVICES | Facility: HOME HEALTH | Age: 60
End: 2025-06-27
Payer: COMMERCIAL

## 2025-06-30 ENCOUNTER — MEDICAL CORRESPONDENCE (OUTPATIENT)
Dept: HOME HEALTH SERVICES | Facility: HOME HEALTH | Age: 60
End: 2025-06-30
Payer: COMMERCIAL

## 2025-06-30 PROCEDURE — S9330 HIT CONT CHEM DIEM: HCPCS

## 2025-07-01 PROCEDURE — S9330 HIT CONT CHEM DIEM: HCPCS

## 2025-07-08 ENCOUNTER — HOME INFUSION (OUTPATIENT)
Dept: HOME HEALTH SERVICES | Facility: HOME HEALTH | Age: 60
End: 2025-07-08
Payer: COMMERCIAL

## 2025-07-08 DIAGNOSIS — C78.89: ICD-10-CM

## 2025-07-11 ENCOUNTER — HOME INFUSION BILLING (OUTPATIENT)
Dept: HOME HEALTH SERVICES | Facility: HOME HEALTH | Age: 60
End: 2025-07-11
Payer: COMMERCIAL

## 2025-07-16 ENCOUNTER — HOME INFUSION (OUTPATIENT)
Dept: HOME HEALTH SERVICES | Facility: HOME HEALTH | Age: 60
End: 2025-07-16
Payer: COMMERCIAL

## 2025-07-16 ENCOUNTER — HOME INFUSION BILLING (OUTPATIENT)
Dept: HOME HEALTH SERVICES | Facility: HOME HEALTH | Age: 60
End: 2025-07-16
Payer: COMMERCIAL

## 2025-07-16 ENCOUNTER — MEDICAL CORRESPONDENCE (OUTPATIENT)
Dept: HOME HEALTH SERVICES | Facility: HOME HEALTH | Age: 60
End: 2025-07-16
Payer: COMMERCIAL

## 2025-07-16 DIAGNOSIS — C78.89: ICD-10-CM

## 2025-07-18 ENCOUNTER — HOME INFUSION BILLING (OUTPATIENT)
Dept: HOME HEALTH SERVICES | Facility: HOME HEALTH | Age: 60
End: 2025-07-18
Payer: COMMERCIAL

## 2025-07-23 ENCOUNTER — MEDICAL CORRESPONDENCE (OUTPATIENT)
Dept: HOME HEALTH SERVICES | Facility: HOME HEALTH | Age: 60
End: 2025-07-23
Payer: COMMERCIAL

## 2025-07-23 PROCEDURE — S9330 HIT CONT CHEM DIEM: HCPCS

## 2025-07-24 PROCEDURE — S9330 HIT CONT CHEM DIEM: HCPCS

## 2025-08-01 ENCOUNTER — HOME INFUSION BILLING (OUTPATIENT)
Dept: HOME HEALTH SERVICES | Facility: HOME HEALTH | Age: 60
End: 2025-08-01
Payer: COMMERCIAL

## 2025-08-06 ENCOUNTER — MEDICAL CORRESPONDENCE (OUTPATIENT)
Dept: HOME HEALTH SERVICES | Facility: HOME HEALTH | Age: 60
End: 2025-08-06
Payer: COMMERCIAL

## 2025-08-15 ENCOUNTER — HOME INFUSION BILLING (OUTPATIENT)
Dept: HOME HEALTH SERVICES | Facility: HOME HEALTH | Age: 60
End: 2025-08-15
Payer: COMMERCIAL

## 2025-08-20 ENCOUNTER — MEDICAL CORRESPONDENCE (OUTPATIENT)
Dept: HOME HEALTH SERVICES | Facility: HOME HEALTH | Age: 60
End: 2025-08-20
Payer: COMMERCIAL

## 2025-08-29 ENCOUNTER — HOME INFUSION (OUTPATIENT)
Dept: HOME HEALTH SERVICES | Facility: HOME HEALTH | Age: 60
End: 2025-08-29
Payer: COMMERCIAL

## 2025-09-02 ENCOUNTER — MEDICAL CORRESPONDENCE (OUTPATIENT)
Dept: HOME HEALTH SERVICES | Facility: HOME HEALTH | Age: 60
End: 2025-09-02
Payer: COMMERCIAL

## (undated) DEVICE — GLOVE BIOGEL PI ULTRATOUCH G SZ 6.0 42160

## (undated) DEVICE — ENDO SHEARS RENEW LAP ENDOCUT SCISSOR TIP 16.5MM 3142

## (undated) DEVICE — SOL ADH LIQUID BENZOIN SWAB 0.6ML C1544

## (undated) DEVICE — GOWN IMPERVIOUS BREATHABLE SMART LG 89015

## (undated) DEVICE — ENDO TROCAR FIRST ENTRY KII FIOS Z-THRD 05X100MM CTF03

## (undated) DEVICE — SUTURE VICRYL+ 4-0 UNDYED PS-2 VCP496H

## (undated) DEVICE — SUCTION MANIFOLD NEPTUNE 2 SYS 1 PORT 702-025-000

## (undated) DEVICE — ESU ELECTRODE MONOPOLAR 5MM DIA 34CML SHAFT L-HO EPS01

## (undated) DEVICE — DRSG STERI STRIP 1/2X4" R1547

## (undated) DEVICE — SOL WATER IRRIG 1000ML BOTTLE 2F7114

## (undated) DEVICE — CLIP APPLIER ENDO 5MM M/L LIGAMAX EL5ML

## (undated) DEVICE — BASIN EMESIS STERILE  SSK9005A

## (undated) DEVICE — TUBING LAP SUCT/IRRIG STRYKER 250070500

## (undated) DEVICE — TUBING IRRIG TUR Y TYPE 96" LF 6543-01

## (undated) DEVICE — TUBING SMOKE EVAC PNEUMOCLEAR HIGH FLOW 0620050250

## (undated) DEVICE — ENDO TROCAR FIRST ENTRY KII FIOS Z-THRD 11X100MM CTF33

## (undated) DEVICE — DECANTER VIAL 2006S

## (undated) DEVICE — ENDO TROCAR SLEEVE KII Z-THREADED 05X100MM CTS02

## (undated) DEVICE — BLADE KNIFE SURG 11 371111

## (undated) DEVICE — DRESSING COVERLET STRIP 3/4 X 3 LF 230

## (undated) DEVICE — SUTURE PASSOR W/GUIDE RSG-14F-4-WG

## (undated) DEVICE — ESU HANDLE PROBE HND CNTRL PENCIL GRIP STRL DISP EPH04

## (undated) DEVICE — PREP CHLORAPREP 26ML TINTED HI-LITE ORANGE 930815

## (undated) DEVICE — CUSTOM PACK LAP CHOLE SBA5BLCHEA

## (undated) DEVICE — ENDO POUCH UNIV RETRIEVAL SYSTEM INZII 10MM CD001

## (undated) DEVICE — SU VICRYL+ 0 27 UR6 VLT VCP603H

## (undated) DEVICE — TUBING SUCTION MEDI-VAC 1/4"X20' N620A - HE

## (undated) DEVICE — PLATE GROUNDING ADULT W/CORD 9165L

## (undated) DEVICE — CLIP LIGACLIP LG YELLOW LT400

## (undated) RX ORDER — PROPOFOL 10 MG/ML
INJECTION, EMULSION INTRAVENOUS
Status: DISPENSED
Start: 2022-12-20

## (undated) RX ORDER — KETOROLAC TROMETHAMINE 30 MG/ML
INJECTION, SOLUTION INTRAMUSCULAR; INTRAVENOUS
Status: DISPENSED
Start: 2022-12-20

## (undated) RX ORDER — DEXAMETHASONE SODIUM PHOSPHATE 10 MG/ML
INJECTION, EMULSION INTRAMUSCULAR; INTRAVENOUS
Status: DISPENSED
Start: 2022-12-20

## (undated) RX ORDER — FENTANYL CITRATE 50 UG/ML
INJECTION, SOLUTION INTRAMUSCULAR; INTRAVENOUS
Status: DISPENSED
Start: 2022-12-20

## (undated) RX ORDER — FENTANYL CITRATE-0.9 % NACL/PF 10 MCG/ML
PLASTIC BAG, INJECTION (ML) INTRAVENOUS
Status: DISPENSED
Start: 2022-12-20

## (undated) RX ORDER — GLYCOPYRROLATE 0.2 MG/ML
INJECTION INTRAMUSCULAR; INTRAVENOUS
Status: DISPENSED
Start: 2022-12-20

## (undated) RX ORDER — LIDOCAINE HYDROCHLORIDE 10 MG/ML
INJECTION, SOLUTION EPIDURAL; INFILTRATION; INTRACAUDAL; PERINEURAL
Status: DISPENSED
Start: 2022-12-20

## (undated) RX ORDER — ONDANSETRON 2 MG/ML
INJECTION INTRAMUSCULAR; INTRAVENOUS
Status: DISPENSED
Start: 2022-12-20